# Patient Record
Sex: MALE | Race: WHITE
[De-identification: names, ages, dates, MRNs, and addresses within clinical notes are randomized per-mention and may not be internally consistent; named-entity substitution may affect disease eponyms.]

---

## 2019-01-29 NOTE — NUR
PT ADMITTED TO UNIT WITH RT HIP FX. PT IS NON WEIGHT BEARING TO RLE. PT C/O
PAIN, MEDS GIVEN AS ORDERED. PT IS NPO. PULSES 2+. PT IS ON ROOM AIR. VITAMIN
K IV GIVEN TO REDUCE LAB VALUES. PENDING LAB VALUES, WILL DETERMINE IF SURGERY
TODAY. FALL RISK PRECAUTIONS IN PLACE. HOULRY ROUNDING COMPLETED. WILL
CONTINUE TO MONITOR.

## 2019-01-29 NOTE — NUR
PT REMAINED ALERT AND ORIENTED. PAIN MEDS GIVEN AS ORDERED. IV VITAMIN K AND
ORAL VITAMIN K GIVEN AS ORDERED. PT WILL HAVE SURGERY TOMORROW AT 0700 TO FIX
RT HIP FX. PT IS NWB TO RT LLE. FALL RISK PRECAUTIONS IN PLACE. HOURLY
ROUNDING COMPLETED. WILL CONTINUE TO MONITOR.

## 2019-01-29 NOTE — OP
[address]  [address]                    OPERATIVE REPORT              
_______________________________________________________________________________
 
Name:       LISA OG           Room:           [address] [place]  
M.R.#:  [id number]      Account #:      [id number]  
Admission:  01/29/19     Attend Phys:    Zita Brunner MD 
Discharge:               Date of Birth:  [birth year]  
         Report #: [id number]
                                                                     [id number]  
_______________________________________________________________________________
THIS REPORT FOR:  //name//                      
 
CC: Prasanna Riosakamarkus
 
PREOPERATIVE DIAGNOSIS:  Right intertrochanteric femur fracture, three-part
(greater trochanter and basicervical component).
 
POSTOPERATIVE DIAGNOSIS:  Right intertrochanteric femur fracture, three-part
(greater trochanter and basicervical component).
 
PROCEDURE: 
1.  Closed reduction and cephalomedullary nailing of right 3-part
intertrochanteric femur fracture.
2.  Physician directed fluoroscopy less than 1 hour.
 
SURGEON:  Lisa Davenport DO.
 
ASSISTANT:  Ayaan Sanchez DO
 
ANESTHESIA:  General.
 
ANTIBIOTICS:  Ancef IV preoperatively.
 
FLUIDS:  850 mL lactated Ringer's.
 
BLOOD LOSS:  100 mL.
 
COMPLICATIONS:  None.
 
SPECIMENS:  None.
 
DRAINS:  None.
 
CONDITION:  The patient is stable to PACU.
 
IMPLANTS:  [place] gamma nail 11 x 180 mm x 125 degree gamma nail with 110 mm
lag screw and appropriately 5 mm distal interlock screw.
 
INDICATION FOR PROCEDURE:  The patient was admitted to [facility]
with a right hip fracture.  I had a long conversation with his family as well as
him regarding the plan for surgery as well as risks and complications at
multiple visits.  Please see previous notes for full details.  I obtained their
consent to proceed with surgery.
 
 
 
 
[address] [place] [address]                    OPERATIVE REPORT              
_______________________________________________________________________________
 
Name:       LISA OG           Room:           [address] [place]  
M.R.#:  [id number]      Account #:      [id number]  
Admission:  01/29/19     Attend Phys:    Zita Brunner MD 
Discharge:               Date of Birth:  [birth year]  
         Report #: [id number]
                                                                     [id number]  
_______________________________________________________________________________
DESCRIPTION OF PROCEDURE:  I marked the right lower extremity in the presence of
the operative team members.  Everyone agreed this was correct.  I marked the
right lower extremity in the presence of the operative team members.  Everyone
agreed this was correct.  He was taken back to the operative suite where a
briefing was performed indicating correct patient, procedure, site, antibiotics
and implants were present.  All team members agreed.  General anesthetic was
administered.  He was transferred over to the operative table in supine
position, well-padded, secured the fracture table.  At that point in time, I
used to perform a closed reduction.  We were able to get this reduction
excellent on multiplanar C-arm imaging and confirmed appropriately.  We then
sterilely prepped and draped the right lower extremity in standard fashion. 
Timeout was performed indicating correct patient, procedure, site, antibiotics
and implants were present and sterile.  All team members agreed.  C-arm was
brought in, marked out our landmarks.  I began by making an incision proximal to
the greater trochanter scalpel through skin and careful soft tissue dissection
down to the greater trochanter.  Smooth tipped guidewire was inserted and
confirmed on multiplanar imaging to be in appropriate position and advanced.  We
then reamed over that guidewire.  Implant timeout was performed, and a gamma
nail 11 x 180 mm x 125 degree was thrown on the back table, assembled and
checked and then inserted into the femur.  We inserted this down into its
appropriate position, made an incision for the double sleeve for lag screw
placement.  This was placed down against bone and then the guidewire was
advanced under multiplanar C-arm imaging including a true lateral, which showed
that the guidewire was in appropriate position.  We did measure the guidewire. 
It measured 105.  We therefore threw a 110 screw on to the back table.  After an
implant timeout was confirmed, reamed to 105 and then placed the lag screw over
the guidewire with lag screw being inserted, we took multiple images and
comparing them to the previous imaging.  There was no malrotation of the neck as
the lag screw was being inserted.  We then inserted our set screw, fully
tightened this down and backed off one quarter turn and confirmed that the set
screw was fully engaged by not being able to turn the lag screwdriver handle
that went in.  We then removed the lag screwdriver handle guidewire and then
made an incision for triple sleeve for the static screw distally.  This was
placed down on to bone, drilled, measured appropriately and an appropriate size
screw after being thrown on the back table was inserted, fully sat down to bone.
 At this point in time, we removed the external aiming guide.  Final C-arm
images showed excellent fracture reduction and placement of all hardware in
multiple planes.  We dismissed C-arm.  After saving those images thoroughly,
irrigated all incision sites with normal saline, and we closed the deep layers
for reapproximation of the fascia and IT band with 0 Vicryl figure-of-eight
interrupted.  We irrigated the subcutaneous layer with normal saline and then
closed this with 2-0 Monocryl buried deep and staples for skin.  Debriefing was
performed.  We confirmed the procedure,  blood loss and that all counts were
correct and final.  All team members agreed.  We placed a sterile silver
impregnated Mepilex dressing.  He was extubated and transferred off the
operating table and taken to PACU in stable.
 
 
 
[place]  [address]                    OPERATIVE REPORT              
_______________________________________________________________________________
 
Name:       LISA OG           Room:           [facility]-    ADM [place]  
M.R.#:  [id number]      Account #:      [id number]  
Admission:  01/29/19     Attend Phys:    Zita Brunner MD 
Discharge:               Date of Birth:  [birth year]  
         Report #: [id number]
                                                                     [id number]  
_______________________________________________________________________________
 
POSTOPERATIVE COURSE AND EVALUATION:  I spoke with family members.  They were
very grateful for my time and efforts.  He was resting in PACU with stable vital
signs.  Pain controlled.  He was neurovascularly intact distally in the right
lower extremity with no change from the preoperative exam.  Compartments are
soft and compressible.  With him sitting in a resting position, rotation was
judged to be appropriate.  He has a little bit of internal rotation deformity on
his left leg; therefore, his right-sided external rotation appears to be within
normal parameters.  PACU films showed stable internal fixation and fracture
reduction.  Deep venous thrombosis prophylaxis, he is on Coumadin.  He will
resume this and Medicine will help us with that transition.  He is to weightbear
as tolerated.  PT, OT.  They have access to my personal number, and I have
encouraged to call anytime for questions or concerns.
 
 
 
 
 
 
 
 
 
 
 
 
 
 
 
 
 
 
 
 
 
 
 
 
 
 
 
 
 
 
 
                       
                                        By:                                
                 
D: 01/30/19 0933_______________________________________
T: 01/30/19 Kristin Davenport DO                /nt

## 2019-01-29 NOTE — EKG
[place] [address]
Phone:  [phone]                     ELECTROCARDIOGRAM REPORT      
_______________________________________________________________________________
 
Name:       LISA OG           Room:           [address]    ADM [place]  
M.R.#:  [id number]      Account #:      [id number]  
Admission:  19     Attend Phys:    Prasanna Thomas MD 
Discharge:               Date of Birth:  [birth year]  
         Report #: [id number]
    [id number]
_______________________________________________________________________________
THIS REPORT FOR:  //name//                      
 
                         [facility] ED
                                       
Test Date:    2019               Test Time:    06:09:34
Pat Name:     LISA OG             Department:   
Patient ID:   [id number]            Room:         [facility]
Gender:       M                        Technician:   DG
:          1945               Requested By: Basilia Shipley
Order Number: [id number] MD:   Michael Talavera
                                 Measurements
Intervals                              Axis          
Rate:         71                       P:            4
MN:           201                      QRS:          27
QRSD:         103                      T:            52
QT:           408                                    
QTc:          444                                    
                           Interpretive Statements
Sinus rhythm
Abnormal R-wave progression, early transition
No previous ECG available for comparison
 
Electronically Signed On 2019 15:00:31 CST by Michael Talavera
[email or web address]
 
 
 
 
 
 
 
 
 
 
 
 
 
 
 
 
 
 
 
  <ELECTRONICALLY SIGNED>
                                           By: Michael Talavera MD, [facility]      
  19     1500
D: 19   _____________________________________
T: 19   Michael Talavera MD, FACC        /EPI

## 2019-01-30 NOTE — NUR
VISITED WITH PTS WIFE,KELSIE AND SISTER IN LAW,SUHAS. PT.SLEPT ON AND OFF. WAS
ORIENTED WHEN AWAKE. WIFE IS RETIRED AND IS WITH PT.ALL OF THE TIME. HE CAN BE
LEFT ALONE HOWEVER. HE HAS A WALKER,LIFT RECLINER AND WC FOR LONG DISTANCES.
IS CURRENT WITH [facility] BUT THEY WERE GETTING TO SIGN OFF. IS SEEN AT WOUND CARE
CENTER AS WELL FOR R LEG CELLULITIS AND LYMPHEDEMA. HE DOES NOT DRIVE. WIFE
DOES THE DRIVING. HE TAKES HIS OWN MEDS AND INSULIN. WIFE SAID SHE FILLS HIS
MED BOX. THEY WOULD LIKE TO GO TO OUR ACUTE INPT. REHAB UNIT. PT/OT TO START
TOMORROW. CM WILL MAKE REHAB REFERRAL TOMORROW.  EXPLAINED TO KELSIE THAT
INSURANCE WILL NEED TO AUTHORIZE REHAB.

## 2019-01-30 NOTE — NUR
WOUND CARE NOTE:  CONSULT RECEIVED FOR RIGHT LEG CELLULITIS/LYMPHEDEMA.
 
PATIENT WAS SEEN 1/16/19 IN THE WOUND CENTER WITH DR. SHARP.  PATIENT HAD A
VENOUS TYPE ULCERATION TO THE ANTERIOR ASPECT OF HIS SHIN. DRESSING INTACT.
REMOVED.  ULCERATION HAS HEALED, THERE ARE STABLE SCABS, BUT NO OPEN WOUNDS.
APPLIED LOTION TO LEG.  MEASURED FOR A DOUBLE LAYER TUBIGRIP SIZE F TO ASSIST
IN CONTROLLING EDEMA AND PREVENT REOPENING OF THE ULCERATION SITES.  EDUCATED
THIS TO PATIENT, BUT HE MAY NEED REINFORCEMENT AS HE DID HAVE SURGERY THIS AM.

## 2019-01-30 NOTE — NUR
PT REMAINED ALERT AND ORIENTED. PT C/O PAIN, OXYCODONE GIVEN AS ORDERED. PT
RECEIVED INSULIN FOR LUNCH AND DINNER, PT REQUESTED DOSAGE DUE TO HOME
REGIMEN. PT HAS NOT AMBULATED, HAS BEEN DROWSY AND SLEPT INBETWEEN MEALS.
MEPILEX C/D/I. FALL RISK PRECAUTIONS IN PLACE. HOURLY ROUNDING COMPLETED. WILL
CONTINUE TO MONITOR.

## 2019-01-30 NOTE — NUR
ASSUMED CARE OF PT AT 1900 PT DROWSY BUT AROUSABLE BUT ORIENTED X4 VS AND
ASSESSMENT STABLE.  PT HAD PAIN MEDS Q2H AND XANAX ONCE. PT THE SLEPT IN
BETWEEN FENTANYL DOSES. PT REFUSED Q2H TURNS. WILL CONTINUE PLAN OF CARE

## 2019-01-30 NOTE — CON
[address]  [address]                    CONSULTATION                  
_______________________________________________________________________________
 
Name:       LISA OG           Room:           [address] [place]  
M.R.#:  [id number]      Account #:      [id number]  
Admission:  01/29/19     Attend Phys:    Prasanna Thomas MD 
Discharge:               Date of Birth:  [birth year]  
         Report #: [id number]
                                                                     [id number]  
_______________________________________________________________________________
THIS REPORT FOR:  //name//                      
 
CC: Prasanna Michaels
 
PRIMARY CARE PHYSICIAN:   Donis Michaels MD
 
INDICATION:  Preoperative evaluation for hip fracture.
 
HISTORY OF PRESENT ILLNESS:  The patient is a very pleasant 74-year-old
gentleman who has a history of a mechanical 25 mm [place] aortic valve
replacement 10 years ago.  At that time, he did not require any additional
bypass grafting.  He had no other valvular heart problems.  By recent
echocardiogram, his EF remains normal.  Valve function remains normal.  He is on
warfarin chronically for anticoagulation.  His INR today was 2.0.  The patient
was admitted through the Emergency Room earlier today with right hip fracture. 
Plans are for hip repair.  The patient has been reversed with 5 mg of IV vitamin
K.  The patient denies any history of coronary artery disease or chest pain. 
The patient has no history of congestive heart failure.
 
ALLERGIES:  ATORVASTATIN AND LISINOPRIL.
 
HOME MEDICATIONS:  Multivitamin 1 daily, Metamucil p.r.n., Xanax 0.5 mg as
directed, Keflex 500 mg daily, simvastatin 20 mg daily, warfarin as directed for
INR, tramadol 50 mg as directed, Mirapex p.r.n., citalopram 20 mg daily. 
Telmisartan daily, dose unknown.  Primidone 250 mg tablets as directed, insulin
as directed, Keppra 750 mg as directed, carvedilol 6.25 mg b.i.d.
 
PAST MEDICAL HISTORY:
1.  Hypertension.
2.  Type 2 diabetes mellitus.
3.  Chronic right lower extremity wound.
4.  Peripheral vascular disease.
5.  Coronary artery disease.
6.  Depression.
7.  Restless leg syndrome.
8.  Osteoarthritis.
9.  History of lung cancer, status post resection.
10.  Obstructive sleep apnea.
11.  Prostate cancer.
12.  Aortic valve replacement as outlined above.
13.  History of colon cancer.
14.  Polyneuropathy.
15.  Chronic kidney disease.
 
 
 
[place] [address]                    CONSULTATION                  
_______________________________________________________________________________
 
Name:       LENKALISA MATHIS           Room:           [address] [place]  
[facility].#:  [id number]      Account #:      [id number]  
Admission:  01/29/19     Attend Phys:    Prasanna Thomas MD 
Discharge:               Date of Birth:  [birth year]  
         Report #: [id number]
                                                                     [id number]  
_______________________________________________________________________________
 
FAMILY HISTORY:  Noncontributory.
 
SOCIAL HISTORY:  The patient quit smoking remotely.  He does not drink alcohol.
 
PHYSICAL EXAMINATION:
VITAL SIGNS:  Blood pressure 180/79, pulse 89 and regular.
GENERAL:  This is a pleasant gentleman who appears to be in no distress.  Mood
and affect appropriate.
HEENT:  Head is normocephalic, atraumatic.  Extraocular muscles intact.  Mucous
membranes are moist.
NECK:  Shows no jugular venous distention.  There are no carotid bruits.
CHEST:  Reveals clear lung fields.
CARDIOVASCULAR:  Reveals a regular rhythm with aortic valve click which is
crisp.  I do not appreciate significant murmur.
ABDOMEN:  Reveals normal bowel sounds.  The abdomen is soft and nontender.
EXTREMITIES:  Show the right lower extremity to be wrapped.  There is some
chronic edema of the left lower extremity.
SKIN:  Generally dry.
 
LABORATORY DATA:  A 12-lead EKG shows sinus rhythm without acute ST or T-wave
abnormality.
 
Labs are reviewed.  Sodium 141, potassium 4.8, chloride 106, bicarbonate 25, BUN
48, creatinine 1.5, serum glucose 160.  LFTs within normal limits.  Protime
20.0, INR 2.0.  White blood cell count 4.9, hemoglobin 13.1, platelet count
222,000.  Chest x-ray shows mild right hemidiaphragm elevation.  No acute
infiltrate or pulmonary edema noted.
 
IMPRESSION AND RECOMMENDATIONS:
1.  Fracture of the right hip.  The patient is not at prohibitive cardiac risk
to proceed with surgery.  The patient's INR has been reversed with IV vitamin K
as outlined above.  Okay to proceed with hip repair per Orthopedic Surgery.
2.  History of aortic valve replacement with a mechanical aortic valve.  Okay to
interrupt anticoagulant therapy temporarily for hip repair surgery. 
Echocardiogram has been obtained and is pending at this time.
3.  Hypertension.  We will make adjustments to the patient's antihypertensives
as needed.
4.  Diabetes per the hospital physician.
5.  History of coronary artery disease without disease requiring intervention,
presently stable.  He is not having angina.
 
 
 
 
[place] [address]                    CONSULTATION                  
_______________________________________________________________________________
 
Name:       LISA OG           Room:           [address] [place]  
M.R.#:  [id number]      Account #:      [id number]  
Admission:  01/29/19     Attend Phys:    Prasanna Thomas MD 
Discharge:               Date of Birth:  [birth year]  
         Report #: [id number]
                                                                     [id number]  
_______________________________________________________________________________
At this point in time, the patient appears stable from a cardiac standpoint to
proceed with right hip repair.
 
 
 
 
 
 
 
 
 
 
 
 
 
 
 
 
 
 
 
 
 
 
 
 
 
 
 
 
 
 
 
 
 
 
 
 
 
 
 
 
 
 
<ELECTRONICALLY SIGNED>
                                        By:  Mahesh Leyva MD, FACC   
01/30/19     0846
D: 01/29/19 1354_______________________________________
T: 01/29/19 1448Micld Leyva MD, FACC      /nt

## 2019-01-31 NOTE — NUR
PT ALERT AND ORIENTED X 3. CONFUSED AT TIMES. IVF INFUSING @ 100 ML/HR IN
RIGHT FOREARM.  RECEIVED PO OXYCONDONE FOR PAIN CONTROL. MEPILEX DRESSING
INTACT. PT UP TO CHAIR WITH ASSIST X 3 WITH THERAPY. DRAINAGE NOTED ON MEPILEX
DRESSING AFTER PATIENT UP TO CHAIR.  PT PLACED BACK TO BED WITH FULL BODY
LIFT. PT HAS SCD ON LEFT LEG AND TUBIGRIP ON RLE IN PLACE. PT HAS 2ND IV
SITE-PATENT. HOURLY ROUNDS MAINTAINED. CALL LIGHT WITHIN REACH.

## 2019-01-31 NOTE — NUR
Oriented x 2-3. He is very forgetful. He was drowsy also. He stated that no
one had checked his blood sugar since he had been in the hospital and I told
him that yes we had but he didn't remember and I also told him he had insulin
to get b/c his blood sugar was 280, he was not happy that I was going to only
give him 6 units. He said he is never that high on his blood sugar and that he
needed 18 units for that. i did give him 12 units at his insistance. He later
ask for pain meds and shortly after it was given asked again for pain meds.
Tubigrip stockinet placed to rt lower extremity for cellulitis that looks
healed except a small amount of scabs. Urine output has not been the best. He
is trying right now.

## 2019-02-01 NOTE — NUR
PT ALERT AND ORIENTED X 3. FORGETFUL. UP WITH ASSIST X 3 AND WITH USE OF FULL
BODY LIFT. PT DENIED PAIN THIS SHIFT. DRESSING-C/D/I. PT NOTED TO HAVE LOW
OXYGEN SATURATION AND PLACED ON 2 L/NC. PT INCONTINENT OF URINE. IV PATENT X
2. HOURLY ROUNDS MAINTAINED. CALL LIGHT WITHIN REACH.

## 2019-02-01 NOTE — NUR
PATIENT HAS SLEPT WELL THROUGHOUT MOST OF THE NIGHT. C/O PAIN ONLY WHEN
REPOSITIONING IN BED. VSS ON 2L 02 VIA NASAL CANNULA. DRESSING TO RIGHT HIP
CHANGED AND NEW DRESSING APPLIED D/T BANDAGE BEING SATURATED. PATIENT HAS NOT
BEEN UP DURING SHIFT. PATIENT HAS BEEN INCONTINENT OF BLADDER AT TIMES DURING
THE NIGHT. IV IN LEFT WRIST-SL AND IV IN RIGHT WRIST-SL. PAIN MEDICATIONS
GIVEN AND CHARTED. FALL PRECAUTIONS IN PLACE AND HOURLY ROUNDS MADE. WILL
CONTINUE WITH PLAN OF CARE AND NURSING TO MONITOR.

## 2019-02-01 NOTE — NUR
REHAB EVAL ORDER IN PLACE. DISCUSSED WITH WIFE AND HER SISTER. IF INSURANCE
LORAINE INPT.REHAB, SHE WOULD PREFER SNF AT [facility]. REFERRAL
MADE TO [facility]. AKSED HER NOT TO START AUTH UNTIL WE FIND OUT IF PT.
CAN GO TO INPT.REHAB. DISCUSSED WITH . HE DOES NOT PLAN TO DISCHARGE
PT.OVER THE WEEKEND.

## 2019-02-02 NOTE — NUR
ASSUMED PATIENT CARE AT 1145. PATIENT TRANSFERED FROM [facility] TO  AT 1145 AND
IS IN ROOM 118. PATIENT LETHARGIC, ORIENTED X4. STATES PAIN IS NOW A 1/10. 2L
NC WITH SAT 96%. BLOOD PRESSURE SLIGHTLY HYPOTENSIVE. DR LOUIE NOTIFIED.
ORDERS FOR NS WIDE OPEN. HOUSE SUPERVISOR NOTIFIED FOR SECOND IV FOR BOLUS AND
IS IN ROOM AT THIS TIME ATTEMPTING TO PLACE. DRESSING TO RIGHT HIP INTACT WITH
SMALL AMOUNT OF BLOODY DRAINAGE. CHANGES AT 0600. H&H CONTINUING TO DROP.
CURRENTLY 7.4. RECHECK ENTERED BY PHYSICIAN. WIFE AND SISTER IN THE ROOM. WILL
CONTINUE WITH CURRENT PLAN OF CARE.

## 2019-02-02 NOTE — NUR
I ASSUMED CARE OF THE PATIENT AT 0700.  HE IS ALERT AND ORIENTED X2-3
AND IS UP WITH MAX ASSIST OF 3 AND A ENEDELIA LIFT.  HE LIVES AT HOME WITH HIS
WIFE AND WAS INDEPENDENT WITH A WALKER BEFORE HE BROKE HIS HIP.  PATIENT IS
STILL HAVING BLEEDING FROM THE INCISION SITE.  LABS ARE FOLLOWED AND DR WAS
CONTACTED.  HE RATES HIS PAIN AT 3 BUT REFUSES PAIN MEDS SINCE IT MAKES HIM
TIRED.  HOURLY ROUNDING WAS COMPLETED AND PATIENT NEEDS ARE MET.  BLOOD SUGAR
IS MANAGED WITH INSULIN.  BLOOD THINNER WAS NOT GIVEN UNTIL AFTER DR VERIFIED
THE ORDER.  PATIENT IS NOT TOLERATING WELL AND IS BEING TRANSFERED TO [facility] FOR
CARDIAC MONITORING.  REPORT CALLED TO LOL[facility].  LEFT UNIT AT 1130.

## 2019-02-02 NOTE — NUR
AT APPROXIMATELY, THE RIGHT HIP DRESSING WAS CHANGED WITH [place] DRESSING
AND TELFA PADS TO AVOID STICKING TO THE INCISIONS. SS DRAINAGE WAS NOTED ALONG
WITH BITS OF COAGULATION. PT REFUSED PAIN MEDICATION ALL SHIFT. WILL CONTINUE.

## 2019-02-02 NOTE — NUR
PATIENT PROGRESSING TOWARDS GOALS. BP MAINTAINED AT NORMAL LIMITS TODAY AFTER
NS BOLUS. PATIENT MORE ALERT AFTER TRANSFER. GOOD APPETITE. FAMILY PRESENT AT
BEDSIDE. ORTHO SAW PATIENT AND REINFORCED RIGHT HIP DRESSING. ORDERS TO
REINFORCE NOT CHANGE DRESSING, AND THEY WILL REEVALUATE IN THE AM.

## 2019-02-02 NOTE — NUR
PATIENT BLOOD PRESSURE RESPONDED TO WIDE OPEN SALINE BOLUS. PHYSICIAN AWARE.
PATIENT SITTING UP EATING LUNCH WITH FAMILY PRESENT.

## 2019-02-03 NOTE — NUR
assumed care of pt. Pt resting in bed, spouse at bedside, appears alert o x
3-4, forgetful, denies chest pain, SOB, states adeq pain control with po pain
RX, spouse states pain RX had been making him forgetful. Pt answered all
questions to person, place, time, but seemed forgetful, easily
confused. O 2at 2l per NC, R hip dressing appears C/D/I, bilat feet warm

## 2019-02-03 NOTE — NUR
RECIEVED REPORT AND ASSUMED CARE AT 1900. CARDIAC MONITOR IN PLACE. DISTOLIC
BP A LOW, OTHER THAN THAT VITAL SIGNS STABLE. PT HAS PAIN IN RIGHT HIP AND ALL
OVER AND PRN PAIN MEDS GIVEN AS ORDERED ALSO APPLIED ICE PACKS TO RIGHT HIP.
ASSESSMENT COMPLETED, DISCUSSED PLAN OF CARE. BED LOCKED, ALARM ON AND CALL
LIGHT WITHIN REACH. FALL PRECAUTIONS IN PLACE. HOURLY ROUNDING DONE AND ALL
NEEDS MET. NURSING WILL CONTINUE TO MONITOR.

## 2019-02-03 NOTE — NUR
DR Guillermo on floor rounding, requested clarification on WARFARIN  orders ,
will give 12 mg mow, and give an additioan scheduled 12 mg at 1800

## 2019-02-04 NOTE — NUR
RECIEVED REPORT AND ASSUMED CARE AT 1900. CARDIAC MONITOR IN PLACE. DIASTOLIC
BP SOFT, OTHER THAN THAT VITAL SIGNS STABLE. PT HAS RIGHT HIP PAIN AND PRN
PAIN MEDS GIVEN AS ORDERED. ASSESSMENT COMPLETED, DISCUSSED PLAN OF CARE, PT
UNDERSTANDS. BED LOCKED AND CALL LIGHT WITHIN REACH. FALL PRECAUTIONS IN
PLACE. HOURLY ROUNDING DONE AND ALL NEEDS MET. PT IS ENEDELIA LIFT MAX ASSIST.

## 2019-02-04 NOTE — NUR
CM spoke with Pt's wife and JACOBY regarding disposition. Wife hopeful that Pt
will qualify for acute rehab. Per wife, plan is for Pt to complete acute
rehab, then dc to home with her, HH and the help of their children and
neighbors if needed. If Pt does not qualify for acute rehab, plan is for Pt to
go to [facility] for skilled. Following.

## 2019-02-04 NOTE — NUR
RECEIVED REPORT FROM BENNIE CARDONA. ASSUMED CARE AROUND 0730. PT A&O X4, FORGETFUL
AT TIMES, TIRED THROUGHOUT SHIFT. VSS. CARDIAC MONITOR IN PLACE TRACING SR
WITH OCCASIONAL PACS THROUGHOUT SHIFT. IV TO RIGH HAND INTACT. MEDS PER EMAR.
FAMILY AT BEDSIDE THROUGHOUT MOST OF SHIFT. PT TOLERATING DIET. PT WORKED WITH
PT/OT THIS SHIFT. PT ON AND OFF BEDPAN MULTIPLE TIMES THROUGHOUT DAY - PT ABLE
TO HAVE SEVERAL SMALL BM'S. PT UP TO BEDSIDE CHAIR TODAY, ENEDELIA LIFT USED. PT
REPORTED PAIN IN RIGHT LEG/HIP THAT WAS MANAGED WITH PO PAIN MEDICATION AND
ICE PACKS WITH PARTIAL RELEIF. DRESSING TO RIGHT HIP CDI.  DISCHARGE PLANNING
FOR REHAB/SKILLED NURSING IN PROGRESS. PT CURRENTLY EATING DINNER IN BED. FALL
PRECAUTIONS IN PLACE. CALL LIGHT IS WITHIN REACH. HOURLY ROUNDING PERFORMED.
WILL CONTINUE TO MONITOR FOR DURATION OF SHIFT.

## 2019-02-04 NOTE — NUR
RECIEVED CONSULT FOR POSSIBLE REHAB ADMISSION. CONSULT HAS BEEN ACKNOWLEDGED
BY REHAB LIAISON AND DR. LOW. PATIENT ADMITTED AFTER FALL WITH RIGHT HIP FX
S/P SURGICAL REPAIR. EVALUATED BY PT/OT. HAS DEFINATE REHAB NEEDS. PATIENT IS
MAX A X2-3 SECONDARY TO PAIN AND CONFUSSION. WILL HAVE 24/7 CARE AT DISCHARGE.
PLOF WAS MOD I. SPOKE WITH BUD NOEL REGARDING HOME SITUATION. ST CONSULT
IS PENDING. WILL FOLLOW ALONG WITH PATIENT TO SEE HOW HE PROGRESSES AND IF
ABLE TO TOLERATE 3 HOURS OF THERAPY. THANK YOU FOR THIS CONSULT.

## 2019-02-05 NOTE — NUR
Nutrition: Pt assessed for LOS, pressure ulcer on Lt buttock. Admitted for Rt
hip FX. Wt: 247#. CHO controlled diet. PO intake better. H/o OA, lung ca,
dementia, DM, CAD, HTN. +BM yday. RX: MVI, warfarin, insulin. , alb 2.4,
prealb 14.7. Increased nutrient needs R/T wound healing AEB labs above, wound.
RD will order Jer b.i.d. to aid in wound healing. Consider mild risk.
Protein intake: Jer b.i.d.
Total energy intake: >75% of meals/supplements.

## 2019-02-05 NOTE — NUR
BLOOD TRANSFUSION STARTED. CONT PULSE OX IN PLACE. SAT 95% ON 1L.
/62, PULSE 66, RESP 19, TEMP 98.1. Meds:  piperacillin/tazobactam IVPB. 3.375 Gram(s) IV Intermittent every 12 hours    Allergies:  Allergies    aspirin (Anaphylaxis)    Intolerances      ROS  [  ] UNABLE TO ELICIT    General:  [  ] None  [  ] Fever  [  ] Chills  [ x ] Malaise    Skin:  [ x ] None [  ] Rash  [  ] Wound  [  ] Ulcer    HEENT:  [ x ] None  [  ] Sore Throat  [  ] Nasal congestion/ runny nose  [  ] Photophobia [  ] Neck pain      Chest:  [  ] None   [  ] SOB  [ x ] Cough  [  ] None    Cardiovascular:   [ x ] None  [  ] CP  [  ] Palpitation    Gastrointestinal:  [ x ] None  [  ] Abd pain   [  ] Nausea    [  ] Vomiting   [  ] Diarrhea	     Genitourinary:  [ x ] None [  ] Polyuria   [  ] Urgency  [  ] Frequency  [  ] Dysuria    [  ]  Hematuria       Musculoskeletal:  [  ] None [  ] Back Pain	[  ] Body aches  [  ] Joint pain  [ x ] Weakness     Neurological: [  ] None [  ]Dizziness  [  ]Visual Disturbance  [  ]Headaches   [ x ] Weakness          PHYSICAL EXAM:  Vital Signs Last 24 Hrs  T(C): 36.4 (09 Aug 2018 07:15), Max: 36.8 (08 Aug 2018 15:37)  T(F): 97.6 (09 Aug 2018 07:15), Max: 98.2 (08 Aug 2018 15:37)  HR: 59 (09 Aug 2018 07:15) (59 - 61)  BP: 149/62 (09 Aug 2018 07:15) (137/63 - 174/62)  BP(mean): --  RR: 18 (09 Aug 2018 07:15) (18 - 20)  SpO2: 98% (09 Aug 2018 07:15) (98% - 100%)    Constitutional:    HEENT: [ x ] Wnl  [  ] Pharyngeal congestion    Neck:  [ x ] Supple  [  ]Lymphadenopathy  [ x ] No JVD   [  ] JVD  [  ] Masses   [  ] WNL    CHEST/Respiratory:  [  ]Clear to auscultation  [ x ] Rales   [  ] Rhonchi   [  ] Wheezing     [  ] Chest Tenderness      Cardiovascular:  [ x ] Reg S1 S2   [  ] Irreg S1 S2   [ x ]No Murmur  [  ] +ve Murmurs  [  ]Systolic [  ]Diastolic      Abdomen:  [ x ] Soft  [ x ] No tendrerness  [  ] Tenderness  [  ] Organomegaly  [  ] ABD Distention  [  ] Rigidity                       [ x ] No Regidity                       [ x ] No Rebound Tenderness  [  ] No Guarding Rigidity  [  ] Rebound Tenderness[  ] Guarding Rigidity                          [ x ]  +ve Bowel Sounds  [  ] Decreased Bowel Sounds    [  ] Absent Bowel Sounds                            Extremities: [ x ] No edema [  ] Edema  [  ] Clubbing   [  ] Cyanosis                         [ x ] No Tender Calf muscles  [  ] Tender Calf muscles                        [ x ] Palpable peripheral pulses    Neurological: [ x ] Awake  [ x ] Alert  [ x ] Oriented  x  3                           [  ] Confused  [  ] Drowsy  [  ] respond to painful stimuli  [  ] Unresponsive    Skin:  [ x ] Intact [  ] Redness [  ] Thrombophlebitis  [  ] Rashes  [  ] Dry  [  ] Ulcers    Ortho:  [  ] Joint Swelling  [  ] Joint erythema [  ] Joint tenderness                [  ] Increased temp. to touch  [  ] DJD [ x ] WNL        LABS/DIAGNOSTIC TESTS                        9.4    8.8   )-----------( 152      ( 09 Aug 2018 06:58 )             28.4   08-09    140  |  106  |  69<H>  ----------------------------<  176<H>  4.4   |  25  |  2.56<H>    Ca    9.1      09 Aug 2018 06:58  Phos  3.8     08-09  Mg     2.5     08-09    TPro  6.1  /  Alb  2.8<L>  /  TBili  0.7  /  DBili  x   /  AST  8<L>  /  ALT  14  /  AlkPhos  52  08-09        CULTURES:   Culture - Blood (08.08.18 @ 00:42)    Specimen Source: .Blood Blood    Culture Results:   No growth to date.    Culture - Blood (08.08.18 @ 00:42)    Specimen Source: .Blood Blood    Culture Results:   No growth to date.      RADIOLOGY:    EXAM:  XR CHEST AP OR PA 1V                            PROCEDURE DATE:  08/07/2018          INTERPRETATION:  Cough.    AP chest. Prior earlier same day.  Low lung volumes. No change heart mediastinum. No change in left basilar   consolidation/atelectasis and small effusion. No pneumothorax or other   new abnormality. Right cardiac pacer reidentified in position.    Impression: Essentially stable exam.          EXAM:  CT CHEST                            PROCEDURE DATE:  08/05/2018          INTERPRETATION:  CT of the Chest without contrast    HISTORY: Shortness of breath with left infiltrate/effusion on chest x-ray    Technique: Multi-slice volumetric acquisition using 2.5 mm collimation.    Intravenous contrast was not administered.     INTERPRETATION: The lung windows demonstrate volume loss of the left lung   with mediastinal shift to the left. The left bronchus is occluded near   its origin likely due to retained secretions. The left lower lobe is   entirely consolidated. There is apparent normal aeration of the left   upper lobe indicating the occlusion of the left bronchus may have   occurred recently.      The mediastinum shows borderline heart size. Right cardiac pacemaker is   present. Prominent lymph nodes are seen in the left paratracheal space.      No pericardial nor pleural effusion is identified.      The trachea and proximal right bronchus show no focal lesions.    The bony structures show spurring along the thoracic spine.      Below the hemidiaphragms, left renal cysts are present and calcified   gallstones lie dependently in the gallbladder. The patient is status post   right nephrectomy. Bowel overlies lateral to the liver.    IMPRESSION: Occluded left bronchus with consolidation of the left lower   lobe and mediastinal shift to the left.          EXAM:  XR CHEST AP OR PA 1V                            PROCEDURE DATE:  08/05/2018          INTERPRETATION:  Chest one view    HISTORY: Chest pain    COMPARISON STUDY: 8/2/2018    Rotated expiratory view of the chest shows the heart to be probably   similar in size. The lungs show consolidation of the left lung base   indicating infiltrate or fluid or a combination of both and there is no   evidence of pneumothorax. Right cardiac pacemaker is again noted.    IMPRESSION:  Left base fluid/infiltrate.      Assessment and Recommendation:   Patient is a 78 y/o male from home, ambulates with a cane with PMHX of CAD, + Stent placed in 2009 on Plavix QOD, AICD placed in 2011 then removed as it got infected, later had a right sided pacemaker placed in dec 2017 for bradycardia , Prostate CA, S/P surgery and RTX, Rt Nephrectomy for Rt Renal cell CA, Htn, HLD  copd, CKD ( baseline creatinine is 2.0) , Bradycardia (s/p Medtronic AICD), CKD S/P Colonoscopy in March 2015 c/w Diverticulosis , HX of Polyps removed, PVD ( s/p balloon left leg stent) came with complain of dyspnea and dry cough x 1 day. Patient was admitted for pneumonia and was started on IV Zosyn, renally adjusted dose.  8/7/18 CXR suggested no change and patient is planned to have CT chest 8/8/18.    Problem/Recommendation - 1:  Problem: Pneumonia, bacterial.   Recommendation:   1- Continue IV Zosyn 3.375 gm IVPB q 12 hours to change to po Levaquin 250 mg po q day till 8/14/18.  2- Renal management.  3- O2 as needed.  4- Bronchodilators, and Pulmonary management.  5- F/u chest CT asper PCP.  6- Blood cultures are still -VE.    Problem/Recommendation - 2:  ·  Problem: COPD exacerbation.    Recommendation:   1- Bronchodilators.  2- O2 as needed.  3- Pulmonary management, and follow up.  4- Steroids as per primary, and pulmonary team.     Problem/Recommendation - 3:  ·  Problem: Hypertension, and CHF.    Recommendation:   1- Monitor Blood pressure closely.  2- Blood pressure control.  3- BP. meds as per cardiology and primary care team.   4- Cardiology management and follow up.    Problem/Recommendation - 4:  ·  Problem: CKD (chronic kidney disease).    Recommendation:   1- ABX dose adjustment.  2- Fluid and electrolytes management.   3- Nephrology follow up.     Discussed with medical resident, and PCP.

## 2019-02-05 NOTE — NUR
ASSUMED CARE OF PATIENT AT THIS TIME. IV X2 SALINE LOCKED. HAVING A BM ON
BEDPAN. O2 AT 1L- SAT 98%. PRESSURE DRESSING TO RIGHT HIP C/D/I. TUBIGRIP TO
RIGHT LEG- REMOVED FOR SKIN CHECK- NO BREAKDOWN NOTED. LEFT SOCK REMOVED FOR
SKIN CHECK- LEFT HEEL BOGY-NO BREAKDOWN-ELEVATED ON PILLOW. DRESSING TO LEFT
SHIN ULCER C/D/I. SKIN TEAR TO RIGHT FOREARM- DRESSING CHANGED- HEALING, BUT
HAS SOME OPEN AREA STILL. REPOSITIONED. ORAL CARE PROVIDED AS PATIENT HAD SOME
RED SPUTUM IN CORNERS OF MOUTH- DENTURES REMOVED AND CLEANED- NO OPEN AREAS
NOTED IN MOUTH.  ORIENTED TO NEW ROOM AND ENVIROMENT. BLOOD TRANSFUSION WAS
ORDERED, BUT BLOOD IS NOT READY YET. PATIENT IS BEING TRANSFERRED VIA ENEDELIA
LIFT. FAMILY AT BEDSIDE. WILL CONTINUE TO MONITOR.

## 2019-02-05 NOTE — NUR
VSS, ASSUMED CARE IN THE AM, ASSESSMENT PERFORMED AND CHARTED, FALL
PRECAUTIONS IN PLACE AND CALL LIGHT IN REACH, PT IS A&O4 AND UP WITH MAX 2
PEOPLE, PT GOAL IS TO WORK WITH PT/OT AND SIT UP IN CHAIR, PT IS ON 1L NC AND
HAS BEEN MADE MED-SURG STATUS, FAMILY AT BEDSIDE, PT CAN BEEN CONFUSED AT
TIMES, CALLED REPORT TO JSSI AND GAVE REPORT WILL BE TAKING PT BY BED TO ROOM
111 HOURLY ROUNDS COMPLETED.

## 2019-02-05 NOTE — NUR
ASSUMED CARE OF PT AFTER REPORT AT 1930. PT A&OX4. CONFUSED & FORGETFUL
AT TIMES. VSS.  PHYSICAL ASSESSMENT COMPLETED AND CHARTED. PT ON O2 AT 1L NC
WITH 95% O2 SAT.  PT TRACING SR PAC ON TELE. PT WITH EPISODES OF INCONTINENT
BLADDER. SKIN TEAR ON LEFT BUTTOCKS NOTED. PHOTOGRAPH TAKEN. COVERED WITH
MEPILEX. CALL LIGHT WITHIN REACH. BED IN LOW POSITION.

## 2019-02-06 NOTE — NUR
PT WAS A&Ox3 DURING SHIFT. VITALS STABLE. 1 UNIT OF BLOOD INFUSED AND
COMPLETED. IV IN L FA LU, SL. WIFE AND SISTER IN LAW AT BEDSIDE. FALL
PRECAUTIONS IN PLACE. HOURLY ROUNDING COMPLETE. Q2H TURNS COMPLETE. CALL LIGHT
WITHIN REACH. WILL CONTINUE TO MONITOR.

## 2019-02-06 NOTE — NUR
DUE TO PT'S CURRENT PROGRESS WITH THERAPY AND INABILITY TO TOLERATE 3 HOURS OF
THERAPY, RECOMMENDATIONS THAT PT DISCHARGE TO SNF.  THANK YOU

## 2019-02-06 NOTE — NUR
[facility] DENIED PT. RECOMMENDS SNF INITIALLY. SEE REHAB NOTE. CM DISCUSSED
WITH WIFE AND SISTER. PT.SLEEPING. WIFE VERBALIZED AN UNDERSTANDING. SHE SAID
PT.REALLY WANTS TO GO TO [facility]. SADIE CALLED ANGELINE AND FAXED
UPDATED INFORMATION TO HER. SHE WILL NEED TO GET INSURANCE AUTHORIZATION.

## 2019-02-06 NOTE — NUR
ASSUMED CARE OF PATIENT AT APPROX 0730. ALERT AND ORIENTED X4. ASSESSMENT
COMPLETED AND AS CHARTED. VSS ON ROOM AIR. NO COMPLAINTS OF NAUSEA OR SOA.
PAIN HAS BEEN MANAGED WITH ORAL MEDICATION. PATIENT IS MAX ASSIST FOR TURNS
AND BEDPAN. RESTED COMFORTABLY IN BED THROUGHOUT THE SHIFT. FALL PRECAUTIONS
IN PLACE. CALL LIGHT WITHIN REACH. HOURLY ROUNDS COMPLETED. NURSING WILL
CONTINUE TO MONITOR.

## 2019-02-07 NOTE — NUR
PATIENT HAS SLEPT OFF AND ON DURING THE NIGHT BUT RESTLESS AT TIMES. VSS ON 1L
02 VIA NASAL CANNULA. PAIN HAS NOT BEEN WELL CONTROLLED DURING THE NIGHT.
PATIENTS FAMILY AND PATIENT REFUSES TO TAKE OXYCODONE. PAIN MEDICATIONS GIVEN
AS ORDERED AND CHARTED. PATIENT REPOSITIONED EVERY 2HRS AND PRN. FAMILY AT
BEDSIDE. DRESSING INTACT. IV IN LEFT FOREARM-SL. IV IN RIGHT HAND-SL. PATIENTS
FAMILY WOULD LIKE PT ON HOLD THIS AM UNTIL ORTHO SEES PATIENT. FALL
PRECAUTIONS IN PLACE AND HOURLY ROUNDS MADE. WILL CONTINUE WITH PLAN OF CARE
AND NURSING TO MONITOR.

## 2019-02-07 NOTE — NUR
ASSUMED CARE OF PATIENT AT APPROX 0730. ALERT AND ORIENTED X4. ASSESMENT
COMPLETED AND AS CHARTED. VSS ON 1 LITER 02. NO COMPLAINTS OF NAUSEA, OR SOA.
PAIN HAS BEEN MANAGED WITH PRN AND SCHEDULED MEDICATIONS. PATIENT HAD A "ROUGH
NIGHT, CRYING IN PAIN" ACCORDING TO FAMILY. PATIENT UP TO CHAIR TODAY WITH
THERAPY. GABAPENTING ORDERED SCHEDULED TODAY, GIVEN TWO DOSES AND PATIENT HAS
BEEN VERY SLEEPY, VSS BUT PATIENT REALLY HARD TO AWAKEN EVEN WITH STERNAL RUB.
THIS NURSE AND FAMILY WORKED TO WAKE THE PATIENT, TALKING WITH HIM AND HAVING
HIM SING AND TELL STORIES TO BECOME ALERT. PATIENT FINALLY ALERT AND HAVING
CONVERSATION WITH THIS NURSE AND HIS FAMILY AT DINNER TIME. FAMILY WOULD LIKE
TO TRY JUST TRAMADOL FOR PAIN MANAGEMENT AS HE TAKES THIS AT HOME. THEY FEEL
THAT THE HYDROCODONE MAY BE TOO MUCH. THEY FEEL THAT THE GABAPENTIN HAS HELPED
HIS PAIN BUT IS MAKING HIM VERY SLEEPY. WILL DISCUS WITH PHYSICIAN. FALL
PRECAUTIONS IN PLACE. CALL LIGHT WITHING REACH. HOURLY ROUNDS COMPLETED.
NURSING WILL CONTINUE TO MONITOR.

## 2019-02-08 NOTE — NUR
ANGELINE/RIANNA DAS [facility] CALLED AND SAID THEY RECEIVED AUTHORIZATION
FOR PT.TO GO TO SNF.  SHE ASKED THAT  SET UP WC VAN WITH [facility] AND THEY
WOULD PAY FOR IT. SET UP WC VAN FOR [id number] WITH [facility] [phone].
PT.NEEDS LG WC AND O2 AT 1L/NC. ANGELINE CALLED BACK AND SAID WIFE HAD SAID
PT.WOULD NEED STRETCHER VAN.  CALL FLOOR AND ASKED MAYARN,PT.S NURSE. SHE
SAID PT.HAD GOTTEN UP TO CHAIR TODAY WITH THERAPY AND COULD GO BY WC VAN.
VANI TO INFORM MAYA OF DISCHARGE TIME, FAX DISCHARGE ORDERS. CHART COPIED
TO GO WITH PT. NURSING TO CALL REPORT [phone]. LEFT VM ON WIFE'S CELL
PHONE REGARDING DISCHARGE AND TIME.

## 2019-02-08 NOTE — NUR
WOUND CARE NOTE:  CONSULT RECEIVED FOR SACRAL BREAKDOWN.
 
PATIENT PRESENTS WITH PARTIAL THICKNESS ULCERATION TO THE LEFT BUTTOCK.  WOUND
MEASURES 1.5X1X0.1.  MOIST, PINK WOUND BED. HUMBERTO-WOUND MACERATED.  CLEANSED
WOUND, PATTED DRY.  APPLIED SKIN PREP TO HUMBERTO-WOUND.  COVERED WITH EXUDERM.
SECURED WITH TEGADERM.
 
PATIENT ALSO HAS SMALL RAISED PURPLE DISCOLORATION TO THE RIGHT BUTTOCK, NO
OPENINGS.
 
PATIENT HAS BEEN USING BED PAN FREQUENTLY, BELIEVE THESE ARE FRICTION INJURIES
RELATED TO THIS.
 
RECOMMEND
TURN Q2 HOURS, KEEP OFF WOUNDS
WAFFLE CUSHION WHEN IN CHAIR
CONTINUE WITH DOUBLE LAYER TUBIGRIP TO THE LEFT LEG
ENCOURAGE GOOD NUTRTION/HYDRATION
TIGHT BLOOD GLUCOSE CONTROL.

## 2019-02-08 NOTE — NUR
INITAL ASSESMENT COMPLETED AT 2100. PT RESTING QUIETLY AT THAT TIME. PT GIVEN
PRN ULTRAM FOR PAIN IN RIGHT HIP. HS CARES DONE AT THAT TIME. VITAL SIGNS
WITHIN NORMAL LIMITS. ULTRASOUND AND XRAY RESULTS REVIEWED WITH PT AND WIFE.
CALL LIGHT IN REACH, HOURLY CHECKS DONE. FALL PRECAUTIONS IN PLACE

## 2019-02-08 NOTE — NUR
PATIENT REMAINED ALERT AND ORIENTED X'S 3-4. VITAL SIGNS STABLE. PATIENT HAS
BEEN DC'D BUT SHIFT HAS ENDED AND NURSE WILL BE GIVING QUICK REPORT SINCE
RIDE HAS NOT ARRIVED YET.  DC PAPERWORK COMPLETED, PATIENT READY TO GO. WILL
CONTINUE TO MONITOR.

## 2020-01-08 NOTE — EKG
[place] [address]
Phone:  [phone]                     ELECTROCARDIOGRAM REPORT      
_______________________________________________________________________________
 
Name:       LISA OG DELFINA           [id number]      Account #:      [id number]  
Admission:  20     Attend Phys:                         
Discharge:  20     Date of Birth:  [birth year]  
         Report #: [id number]
    [id number]
_______________________________________________________________________________
THIS REPORT FOR:  //name//                      
 
                         [facility] ED
                                       
Test Date:    2020               Test Time:    14:21:04
Pat Name:     LISA OG             Department:   
Patient ID:   [id number]            Room:          
Gender:       M                        Technician:   CONSTANTINE
:          1945               Requested By: Jermaine Ibrahim
Order Number: [id number] MD:   Michael Talavera
                                 Measurements
Intervals                              Axis          
Rate:         76                       P:            11
TN:           188                      QRS:          4
QRSD:         123                      T:            17
QT:           392                                    
QTc:          441                                    
                           Interpretive Statements
Sinus rhythm
early transition
Compared to ECG 2019 06:09:34
No significant changes
 
Electronically Signed On 2020 10:07:44 CST by Michael Talavera
[email or web address]
 
 
 
 
 
 
 
 
 
 
 
 
 
 
 
 
 
 
  <ELECTRONICALLY SIGNED>
                                           By: Michael Talavera MD, FACC      
  20     1007
D: 20 1421   _____________________________________
T: 20 1421   Michael Talavera MD, FACC        /EPI

## 2020-05-06 ENCOUNTER — HOSPITAL ENCOUNTER (OUTPATIENT)
Dept: HOSPITAL 96 - M.WC | Age: 75
End: 2020-05-06
Attending: PODIATRIST
Payer: MEDICARE

## 2020-05-06 DIAGNOSIS — E11.40: ICD-10-CM

## 2020-05-06 DIAGNOSIS — Z85.46: ICD-10-CM

## 2020-05-06 DIAGNOSIS — Z79.4: ICD-10-CM

## 2020-05-06 DIAGNOSIS — E11.621: ICD-10-CM

## 2020-05-06 DIAGNOSIS — L03.115: ICD-10-CM

## 2020-05-06 DIAGNOSIS — F32.9: ICD-10-CM

## 2020-05-06 DIAGNOSIS — G25.81: ICD-10-CM

## 2020-05-06 DIAGNOSIS — E78.5: ICD-10-CM

## 2020-05-06 DIAGNOSIS — M48.061: ICD-10-CM

## 2020-05-06 DIAGNOSIS — I25.10: ICD-10-CM

## 2020-05-06 DIAGNOSIS — F03.90: ICD-10-CM

## 2020-05-06 DIAGNOSIS — L97.811: ICD-10-CM

## 2020-05-06 DIAGNOSIS — E11.319: ICD-10-CM

## 2020-05-06 DIAGNOSIS — I96: ICD-10-CM

## 2020-05-06 DIAGNOSIS — I10: ICD-10-CM

## 2020-05-06 DIAGNOSIS — L97.421: ICD-10-CM

## 2020-05-06 DIAGNOSIS — F41.9: ICD-10-CM

## 2020-05-06 DIAGNOSIS — E11.622: Primary | ICD-10-CM

## 2020-05-06 DIAGNOSIS — G47.30: ICD-10-CM

## 2020-05-06 DIAGNOSIS — Z90.49: ICD-10-CM

## 2020-05-06 DIAGNOSIS — Z87.891: ICD-10-CM

## 2020-05-06 DIAGNOSIS — G40.909: ICD-10-CM

## 2020-05-06 DIAGNOSIS — E11.52: ICD-10-CM

## 2020-05-06 DIAGNOSIS — Z85.118: ICD-10-CM

## 2020-05-06 DIAGNOSIS — L97.411: ICD-10-CM

## 2020-05-06 DIAGNOSIS — M19.90: ICD-10-CM

## 2020-05-06 LAB
ABSOLUTE BASOPHILS: 0 THOU/UL (ref 0–0.2)
ABSOLUTE EOSINOPHILS: 0.1 THOU/UL (ref 0–0.7)
ABSOLUTE MONOCYTES: 0.4 THOU/UL (ref 0–1.2)
ALBUMIN SERPL-MCNC: 3 G/DL (ref 3.4–5)
ALP SERPL-CCNC: 140 U/L (ref 46–116)
ALT SERPL-CCNC: 25 U/L (ref 30–65)
ANION GAP SERPL CALC-SCNC: 6 MMOL/L (ref 7–16)
AST SERPL-CCNC: 19 U/L (ref 15–37)
BASOPHILS NFR BLD AUTO: 0.7 %
BILIRUB SERPL-MCNC: 0.1 MG/DL
BUN SERPL-MCNC: 25 MG/DL (ref 7–18)
CALCIUM SERPL-MCNC: 8.4 MG/DL (ref 8.5–10.1)
CHLORIDE SERPL-SCNC: 105 MMOL/L (ref 98–107)
CO2 SERPL-SCNC: 29 MMOL/L (ref 21–32)
CREAT SERPL-MCNC: 1.4 MG/DL (ref 0.6–1.3)
EOSINOPHIL NFR BLD: 1.8 %
ESR (SEDRATE): 77 MM/HR (ref 0–20)
GLUCOSE SERPL-MCNC: 167 MG/DL (ref 70–99)
GRANULOCYTES NFR BLD MANUAL: 75.4 %
HCT VFR BLD CALC: 31.3 % (ref 42–52)
HGB BLD-MCNC: 10.4 GM/DL (ref 14–18)
LYMPHOCYTES # BLD: 0.9 THOU/UL (ref 0.8–5.3)
LYMPHOCYTES NFR BLD AUTO: 15.6 %
MCH RBC QN AUTO: 29.2 PG (ref 26–34)
MCHC RBC AUTO-ENTMCNC: 33.3 G/DL (ref 28–37)
MCV RBC: 87.7 FL (ref 80–100)
MONOCYTES NFR BLD: 6.5 %
MPV: 7.2 FL. (ref 7.2–11.1)
NEUTROPHILS # BLD: 4.5 THOU/UL (ref 1.6–8.1)
NUCLEATED RBCS: 0 /100WBC
PLATELET COUNT*: 314 THOU/UL (ref 150–400)
POTASSIUM SERPL-SCNC: 4.3 MMOL/L (ref 3.5–5.1)
PROT SERPL-MCNC: 7.9 G/DL (ref 6.4–8.2)
RBC # BLD AUTO: 3.57 MIL/UL (ref 4.5–6)
RDW-CV: 15.8 % (ref 10.5–14.5)
SODIUM SERPL-SCNC: 140 MMOL/L (ref 136–145)
WBC # BLD AUTO: 5.9 THOU/UL (ref 4–11)

## 2020-05-13 ENCOUNTER — HOSPITAL ENCOUNTER (OUTPATIENT)
Dept: HOSPITAL 96 - M.WC | Age: 75
End: 2020-05-13
Attending: PODIATRIST
Payer: MEDICARE

## 2020-05-13 DIAGNOSIS — L97.811: ICD-10-CM

## 2020-05-13 DIAGNOSIS — F32.9: ICD-10-CM

## 2020-05-13 DIAGNOSIS — L97.522: ICD-10-CM

## 2020-05-13 DIAGNOSIS — F03.90: ICD-10-CM

## 2020-05-13 DIAGNOSIS — L97.411: ICD-10-CM

## 2020-05-13 DIAGNOSIS — G40.909: ICD-10-CM

## 2020-05-13 DIAGNOSIS — Z87.891: ICD-10-CM

## 2020-05-13 DIAGNOSIS — E11.40: ICD-10-CM

## 2020-05-13 DIAGNOSIS — I25.10: ICD-10-CM

## 2020-05-13 DIAGNOSIS — G25.81: ICD-10-CM

## 2020-05-13 DIAGNOSIS — E11.319: ICD-10-CM

## 2020-05-13 DIAGNOSIS — E78.5: ICD-10-CM

## 2020-05-13 DIAGNOSIS — L97.422: ICD-10-CM

## 2020-05-13 DIAGNOSIS — Z85.46: ICD-10-CM

## 2020-05-13 DIAGNOSIS — I10: ICD-10-CM

## 2020-05-13 DIAGNOSIS — E11.621: ICD-10-CM

## 2020-05-13 DIAGNOSIS — I96: ICD-10-CM

## 2020-05-13 DIAGNOSIS — M48.061: ICD-10-CM

## 2020-05-13 DIAGNOSIS — M19.90: ICD-10-CM

## 2020-05-13 DIAGNOSIS — E11.52: ICD-10-CM

## 2020-05-13 DIAGNOSIS — L03.115: ICD-10-CM

## 2020-05-13 DIAGNOSIS — F41.9: ICD-10-CM

## 2020-05-13 DIAGNOSIS — E11.622: Primary | ICD-10-CM

## 2020-05-13 DIAGNOSIS — G47.30: ICD-10-CM

## 2020-05-20 ENCOUNTER — HOSPITAL ENCOUNTER (OUTPATIENT)
Dept: HOSPITAL 96 - M.WC | Age: 75
End: 2020-05-20
Attending: PODIATRIST
Payer: MEDICARE

## 2020-05-20 DIAGNOSIS — E11.52: ICD-10-CM

## 2020-05-20 DIAGNOSIS — L97.422: ICD-10-CM

## 2020-05-20 DIAGNOSIS — L03.115: ICD-10-CM

## 2020-05-20 DIAGNOSIS — E11.621: Primary | ICD-10-CM

## 2020-05-20 DIAGNOSIS — Z87.891: ICD-10-CM

## 2020-05-20 DIAGNOSIS — E11.42: ICD-10-CM

## 2020-05-20 DIAGNOSIS — L97.411: ICD-10-CM

## 2020-05-20 DIAGNOSIS — L97.521: ICD-10-CM

## 2020-05-20 DIAGNOSIS — F41.9: ICD-10-CM

## 2020-05-20 DIAGNOSIS — I96: ICD-10-CM

## 2020-05-21 NOTE — CON
[address]  [address]                    CONSULTATION                  
_______________________________________________________________________________
 
Name:       LENKALISA DEVAN                 Room:                      [facility] 
M.R.#:  [id number]      Account #:      [id number]  
Admission:  05/20/20     Attend Phys:    Saroj Castellanos DPM 
Discharge:               Date of Birth:  [birth year]  
         Report #: [id number]
                                                                     [id number]  
_______________________________________________________________________________
THIS REPORT FOR:  //name//                      
 
cc:  Donis Michaels MD, Vinod N. MD                                               ~
THIS REPORT FOR:  //name//                      
 
CC: Saroj Michaels
 
DATE OF SERVICE:  05/20/2020
 
 
INFECTIOUS DISEASE CONSULTATION
 
He was seen in outpatient [facility] at [place].
 
ATTENDING PHYSICIAN:  Saroj Castellanos DPM
 
HISTORY OF PRESENT ILLNESS:  He presents today for ongoing treatment for
bilateral posterior heel decubitus ulcers, it has been longstanding.  The left
complicated by polymicrobial infection for which he was initially seen 2 weeks
ago, been on therapy with Augmentin.  Generally, he denies significant pain,
although he does have some throbbing in his left ankle today.  He denies any
systemic illness.  No fevers or chills.  Appetite has actually been somewhat
improved.  On evaluation, the wounds appear to be marginally better, quite
superficial on the right, has good granulation tissue left, there is some degree
of necrotic material.  There is no particular odor, no purulence.  Overall, the
appearance has improved with increased granulation.  There is a suggestion of
exposed hard tissue.  Overall degree of inflammation has decreased.
 
Review of the labs notable for the sed rate being 70 range.
 
Bilateral heel decubitus ulcers, we will continue the Augmentin as prescribed. 
Discussed with the patient and his spouse in detail.  Dr. Castellanos debrided the
wound and see how he does over the course of next couple of weeks.  I encouraged
to optimize his nutritional intake and offload the sites.
 
 
 
 
 
 
 
 
<ELECTRONICALLY SIGNED>
                                        By:  Malik Silva MD           
05/21/20     1131
D: 05/21/20 0837_______________________________________
T: 05/21/20 0858Joector Silva MD              /nt

## 2020-05-27 ENCOUNTER — HOSPITAL ENCOUNTER (OUTPATIENT)
Dept: HOSPITAL 96 - M.WC | Age: 75
End: 2020-05-27
Attending: PODIATRIST
Payer: MEDICARE

## 2020-05-27 DIAGNOSIS — E11.40: ICD-10-CM

## 2020-05-27 DIAGNOSIS — I25.10: ICD-10-CM

## 2020-05-27 DIAGNOSIS — G47.30: ICD-10-CM

## 2020-05-27 DIAGNOSIS — I10: ICD-10-CM

## 2020-05-27 DIAGNOSIS — E11.51: ICD-10-CM

## 2020-05-27 DIAGNOSIS — Z85.118: ICD-10-CM

## 2020-05-27 DIAGNOSIS — L03.115: ICD-10-CM

## 2020-05-27 DIAGNOSIS — F32.9: ICD-10-CM

## 2020-05-27 DIAGNOSIS — L97.422: ICD-10-CM

## 2020-05-27 DIAGNOSIS — F41.9: ICD-10-CM

## 2020-05-27 DIAGNOSIS — F03.90: ICD-10-CM

## 2020-05-27 DIAGNOSIS — G25.81: ICD-10-CM

## 2020-05-27 DIAGNOSIS — L97.522: ICD-10-CM

## 2020-05-27 DIAGNOSIS — G40.909: ICD-10-CM

## 2020-05-27 DIAGNOSIS — Z79.4: ICD-10-CM

## 2020-05-27 DIAGNOSIS — Z85.46: ICD-10-CM

## 2020-05-27 DIAGNOSIS — Z87.891: ICD-10-CM

## 2020-05-27 DIAGNOSIS — E11.319: ICD-10-CM

## 2020-05-27 DIAGNOSIS — E11.621: Primary | ICD-10-CM

## 2020-05-27 DIAGNOSIS — E78.5: ICD-10-CM

## 2020-05-27 DIAGNOSIS — M48.061: ICD-10-CM

## 2020-05-27 DIAGNOSIS — L97.412: ICD-10-CM

## 2020-06-03 ENCOUNTER — HOSPITAL ENCOUNTER (OUTPATIENT)
Dept: HOSPITAL 96 - M.WC | Age: 75
End: 2020-06-03
Attending: PODIATRIST
Payer: MEDICARE

## 2020-06-03 DIAGNOSIS — L03.115: ICD-10-CM

## 2020-06-03 DIAGNOSIS — G40.909: ICD-10-CM

## 2020-06-03 DIAGNOSIS — L97.422: ICD-10-CM

## 2020-06-03 DIAGNOSIS — E11.319: ICD-10-CM

## 2020-06-03 DIAGNOSIS — L97.521: ICD-10-CM

## 2020-06-03 DIAGNOSIS — G25.81: ICD-10-CM

## 2020-06-03 DIAGNOSIS — I10: ICD-10-CM

## 2020-06-03 DIAGNOSIS — Z85.46: ICD-10-CM

## 2020-06-03 DIAGNOSIS — F32.9: ICD-10-CM

## 2020-06-03 DIAGNOSIS — M19.90: ICD-10-CM

## 2020-06-03 DIAGNOSIS — L97.412: ICD-10-CM

## 2020-06-03 DIAGNOSIS — G47.30: ICD-10-CM

## 2020-06-03 DIAGNOSIS — E11.52: ICD-10-CM

## 2020-06-03 DIAGNOSIS — E11.40: ICD-10-CM

## 2020-06-03 DIAGNOSIS — E78.5: ICD-10-CM

## 2020-06-03 DIAGNOSIS — F03.90: ICD-10-CM

## 2020-06-03 DIAGNOSIS — Z87.891: ICD-10-CM

## 2020-06-03 DIAGNOSIS — E11.621: Primary | ICD-10-CM

## 2020-06-03 DIAGNOSIS — F41.9: ICD-10-CM

## 2020-06-03 DIAGNOSIS — I25.10: ICD-10-CM

## 2020-06-03 DIAGNOSIS — I96: ICD-10-CM

## 2020-06-03 DIAGNOSIS — Z85.118: ICD-10-CM

## 2020-06-04 NOTE — CON
[address]  [address]                    CONSULTATION                  
_______________________________________________________________________________
 
Name:       LISA OG                 Room:                      [facility] 
M.R.#:  [id number]      Account #:      [id number]  
Admission:  06/03/20     Attend Phys:    Saroj Castellanos DPM 
Discharge:               Date of Birth:  [birth year]  
         Report #: [id number]
                                                                     [id number]  
_______________________________________________________________________________
THIS REPORT FOR:  //name//                      
 
cc:  Donis Michaels MD, Vinod N. MD                                               ~
THIS REPORT FOR:  //name//                      
 
CC: Saroj Michaels
 
DATE OF SERVICE:  06/03/2020
 
 
INFECTIOUS DISEASE CONSULTATION
 
ATTENDING PHYSICIAN:  Saroj Castellanos DPM
 
HISTORY OF PRESENT ILLNESS:  He is here for bilateral posterior plantar wounds. 
These are pressure related decubitus ulcers due to being wheelchair bound and
nonambulatory.  He has been undergoing wound care for the last several weeks. 
He has been on treatment with Augmentin.  Seemingly, he tolerates that without
difficulty.
 
PHYSICAL EXAMINATION:  On examination, the wounds overall have improved.  There
is decreased inflammation noted.  Did undergo debridement.  Had adequate
bleeding.  On questioning, denies any systemic illness.  No fevers or chills. 
His appetite has been good.  He has been increased his oral intake as well.
 
ASSESSMENT AND PLAN:  Chronic bilateral heel decubitus ulcer with concern about
infection involving the left side.  At this point, there is no exposed hard
tissue.  I think it is reasonable to continue the current approach at least
additional 2 weeks.  We will check weekly labs.  Noted that his sed rate has
come down from 74-47.  Discussed with this patient and the patient's spouse.  We
will see him in followup in 2 weeks.
 
 
 
 
 
 
 
 
 
 
 
<ELECTRONICALLY SIGNED>
                                        By:  Malik Silva MD           
06/04/20     1129
D: 06/03/20 1533_______________________________________
T: 06/03/20 2106Joector Silva MD              /nt

## 2020-06-10 ENCOUNTER — HOSPITAL ENCOUNTER (OUTPATIENT)
Dept: HOSPITAL 96 - M.WC | Age: 75
End: 2020-06-10
Attending: PODIATRIST
Payer: MEDICARE

## 2020-06-10 DIAGNOSIS — I10: ICD-10-CM

## 2020-06-10 DIAGNOSIS — M48.061: ICD-10-CM

## 2020-06-10 DIAGNOSIS — L97.422: ICD-10-CM

## 2020-06-10 DIAGNOSIS — L97.522: ICD-10-CM

## 2020-06-10 DIAGNOSIS — Z87.891: ICD-10-CM

## 2020-06-10 DIAGNOSIS — Z85.46: ICD-10-CM

## 2020-06-10 DIAGNOSIS — E11.319: ICD-10-CM

## 2020-06-10 DIAGNOSIS — L97.411: ICD-10-CM

## 2020-06-10 DIAGNOSIS — I25.10: ICD-10-CM

## 2020-06-10 DIAGNOSIS — F32.9: ICD-10-CM

## 2020-06-10 DIAGNOSIS — Z85.118: ICD-10-CM

## 2020-06-10 DIAGNOSIS — M19.90: ICD-10-CM

## 2020-06-10 DIAGNOSIS — E11.621: Primary | ICD-10-CM

## 2020-06-10 DIAGNOSIS — E11.51: ICD-10-CM

## 2020-06-10 DIAGNOSIS — F03.90: ICD-10-CM

## 2020-06-10 DIAGNOSIS — F41.9: ICD-10-CM

## 2020-06-10 DIAGNOSIS — E11.40: ICD-10-CM

## 2020-06-10 DIAGNOSIS — L03.115: ICD-10-CM

## 2020-06-10 DIAGNOSIS — G47.30: ICD-10-CM

## 2020-06-10 DIAGNOSIS — G40.909: ICD-10-CM

## 2020-06-10 DIAGNOSIS — G25.81: ICD-10-CM

## 2020-06-10 DIAGNOSIS — E78.5: ICD-10-CM

## 2020-06-17 ENCOUNTER — HOSPITAL ENCOUNTER (OUTPATIENT)
Dept: HOSPITAL 96 - M.WC | Age: 75
End: 2020-06-17
Attending: PODIATRIST
Payer: MEDICARE

## 2020-06-17 DIAGNOSIS — F32.9: ICD-10-CM

## 2020-06-17 DIAGNOSIS — M48.061: ICD-10-CM

## 2020-06-17 DIAGNOSIS — E11.319: ICD-10-CM

## 2020-06-17 DIAGNOSIS — L97.412: ICD-10-CM

## 2020-06-17 DIAGNOSIS — F03.90: ICD-10-CM

## 2020-06-17 DIAGNOSIS — Z87.891: ICD-10-CM

## 2020-06-17 DIAGNOSIS — E11.52: ICD-10-CM

## 2020-06-17 DIAGNOSIS — E78.5: ICD-10-CM

## 2020-06-17 DIAGNOSIS — G43.909: ICD-10-CM

## 2020-06-17 DIAGNOSIS — L03.115: ICD-10-CM

## 2020-06-17 DIAGNOSIS — E11.40: ICD-10-CM

## 2020-06-17 DIAGNOSIS — Z85.46: ICD-10-CM

## 2020-06-17 DIAGNOSIS — Z85.118: ICD-10-CM

## 2020-06-17 DIAGNOSIS — M19.90: ICD-10-CM

## 2020-06-17 DIAGNOSIS — E11.42: ICD-10-CM

## 2020-06-17 DIAGNOSIS — L97.521: ICD-10-CM

## 2020-06-17 DIAGNOSIS — G47.30: ICD-10-CM

## 2020-06-17 DIAGNOSIS — G25.81: ICD-10-CM

## 2020-06-17 DIAGNOSIS — I10: ICD-10-CM

## 2020-06-17 DIAGNOSIS — I96: ICD-10-CM

## 2020-06-17 DIAGNOSIS — I25.10: ICD-10-CM

## 2020-06-17 DIAGNOSIS — F41.9: ICD-10-CM

## 2020-06-17 DIAGNOSIS — E11.621: Primary | ICD-10-CM

## 2020-06-18 NOTE — CON
[address]  [address]                    CONSULTATION                  
_______________________________________________________________________________
 
Name:       LENKALISA DEVAN                 Room:                      REG CLI 
M.R.#:  [id number]      Account #:      [id number]  
Admission:  06/17/20     Attend Phys:    Saroj Castellanos DPM 
Discharge:               Date of Birth:  [birth year]  
         Report #: [id number]
                                                                     [id number]  
_______________________________________________________________________________
THIS REPORT FOR:  //name//                      
 
cc:  Donis Michaels MD, Vinod N. MD                                               ~
THIS REPORT FOR:  //name//                      
 
CC: Saroj Michaels
 
DATE OF SERVICE:  06/17/2020
 
 
INFECTIOUS DISEASE CONSULTATION FOLLOWUP
 
ATTENDING PHYSICIAN:  Saroj Castellanos DPM
 
He is seen in the [facility] [place].
 
HISTORY OF PRESENT ILLNESS:  Chart reviewed, the patient examined.  He returns
today with bilateral decubitus ulcers involving his posterior heels. Left has
historically been more concerning than the right.  On the right, he has
superficial ulceration.  No exposed hard tissue.  It continues to show
improvement.  It has good granulation tissue.  It is not friable.  The overall
margins with less inflammation noted.  On the left side, it does have some
degree of depth.  There is a thin fibrous tissue overlying bone at one point
involving the calcaneus especially into the Achilles tendon.  Again, this is
improved as well.  There is very little necrotic material.  There is no
purulence, no odor that I can appreciate.  Overall degree of inflammation has
decreased as well.
 
ASSESSMENT AND PLAN:  Chronic bilateral heel decubitus ulcers.  I think he
remains at risk.  He has been utilizing some offloading.  Continue wound care as
prescribed by Dr. Castellanos.  We will continue the Augmentin that he has been on for
at least additional 2 weeks to see how he does clinically.
 
 
 
 
 
 
 
 
 
 
<ELECTRONICALLY SIGNED>
                                        By:  Malik Silva MD           
06/18/20     1412
D: 06/18/20 0936_______________________________________
T: 06/18/20 1023Joector Silva MD              /nt

## 2020-06-24 ENCOUNTER — HOSPITAL ENCOUNTER (OUTPATIENT)
Dept: HOSPITAL 96 - M.WC | Age: 75
End: 2020-06-24
Attending: PODIATRIST
Payer: MEDICARE

## 2020-06-24 DIAGNOSIS — M48.061: ICD-10-CM

## 2020-06-24 DIAGNOSIS — E11.52: ICD-10-CM

## 2020-06-24 DIAGNOSIS — I10: ICD-10-CM

## 2020-06-24 DIAGNOSIS — L97.411: ICD-10-CM

## 2020-06-24 DIAGNOSIS — L97.422: ICD-10-CM

## 2020-06-24 DIAGNOSIS — E11.42: ICD-10-CM

## 2020-06-24 DIAGNOSIS — F32.9: ICD-10-CM

## 2020-06-24 DIAGNOSIS — I25.10: ICD-10-CM

## 2020-06-24 DIAGNOSIS — L97.521: ICD-10-CM

## 2020-06-24 DIAGNOSIS — G25.81: ICD-10-CM

## 2020-06-24 DIAGNOSIS — L03.115: ICD-10-CM

## 2020-06-24 DIAGNOSIS — L97.511: ICD-10-CM

## 2020-06-24 DIAGNOSIS — Z85.118: ICD-10-CM

## 2020-06-24 DIAGNOSIS — E78.5: ICD-10-CM

## 2020-06-24 DIAGNOSIS — E11.319: ICD-10-CM

## 2020-06-24 DIAGNOSIS — E11.621: Primary | ICD-10-CM

## 2020-06-24 DIAGNOSIS — G47.30: ICD-10-CM

## 2020-06-24 DIAGNOSIS — Z85.46: ICD-10-CM

## 2020-06-24 DIAGNOSIS — F03.90: ICD-10-CM

## 2020-06-24 DIAGNOSIS — Z87.891: ICD-10-CM

## 2020-06-24 DIAGNOSIS — M19.90: ICD-10-CM

## 2020-06-24 DIAGNOSIS — G40.909: ICD-10-CM

## 2020-06-24 DIAGNOSIS — I96: ICD-10-CM

## 2020-07-01 ENCOUNTER — HOSPITAL ENCOUNTER (OUTPATIENT)
Dept: HOSPITAL 96 - M.WC | Age: 75
End: 2020-07-01
Attending: PODIATRIST
Payer: MEDICARE

## 2020-07-01 DIAGNOSIS — G25.81: ICD-10-CM

## 2020-07-01 DIAGNOSIS — E11.40: ICD-10-CM

## 2020-07-01 DIAGNOSIS — I10: ICD-10-CM

## 2020-07-01 DIAGNOSIS — M48.061: ICD-10-CM

## 2020-07-01 DIAGNOSIS — E11.319: ICD-10-CM

## 2020-07-01 DIAGNOSIS — G40.909: ICD-10-CM

## 2020-07-01 DIAGNOSIS — E78.5: ICD-10-CM

## 2020-07-01 DIAGNOSIS — M19.90: ICD-10-CM

## 2020-07-01 DIAGNOSIS — L97.521: ICD-10-CM

## 2020-07-01 DIAGNOSIS — F32.9: ICD-10-CM

## 2020-07-01 DIAGNOSIS — L97.412: ICD-10-CM

## 2020-07-01 DIAGNOSIS — E11.51: ICD-10-CM

## 2020-07-01 DIAGNOSIS — Z87.891: ICD-10-CM

## 2020-07-01 DIAGNOSIS — I25.10: ICD-10-CM

## 2020-07-01 DIAGNOSIS — Z85.46: ICD-10-CM

## 2020-07-01 DIAGNOSIS — F03.90: ICD-10-CM

## 2020-07-01 DIAGNOSIS — L97.422: ICD-10-CM

## 2020-07-01 DIAGNOSIS — Z85.118: ICD-10-CM

## 2020-07-01 DIAGNOSIS — E11.621: Primary | ICD-10-CM

## 2020-07-01 DIAGNOSIS — G47.30: ICD-10-CM

## 2020-07-01 DIAGNOSIS — L97.511: ICD-10-CM

## 2020-07-01 DIAGNOSIS — F41.9: ICD-10-CM

## 2020-07-01 DIAGNOSIS — L03.115: ICD-10-CM

## 2020-07-02 NOTE — CON
[address]  [address]                    CONSULTATION                  
_______________________________________________________________________________
 
Name:       LENKALISA DEVAN                 Room:                      REG CLI 
M.R.#:  [id number]      Account #:      [id number]  
Admission:  07/01/20     Attend Phys:    Saroj Castellanos DPM 
Discharge:               Date of Birth:  [birth year]  
         Report #: [id number]
                                                                     [id number]  
_______________________________________________________________________________
THIS REPORT FOR:  //name//                      
 
cc:  Donis Michaels MD, Vinod N. MD                                               ~
THIS REPORT FOR:  //name//                      
 
CC: Saroj Michaels
 
DATE OF SERVICE:  07/01/2020
 
 
INFECTIOUS DISEASE CONSULTATION FOLLOWUP
 
ATTENDING PHYSICIAN:  Saroj Castellanos DPM
 
HISTORY OF PRESENT ILLNESS:  He is here for bilateral posterior heel decubitus
ulcers, left being more severe.  He has been on systemic antibiotics with
Augmentin.  Had a previous exposed calcaneus as well as Achilles tendon. 
Generally, he has been doing fairly well.  He is still nonweightbearing,
undergoing wound care with intermittent debridements.
 
PHYSICAL EXAMINATION:  On examination today, appears to have a soft tissue over
the hard tissue.  There is mild degree of inflammation noted.  He does not have
significant pain associated with it at present.  The right-sided ulcer continues
to improve as well.
 
ASSESSMENT AND PLAN:  Deep left heel infection.  Given the totality of the
situation, I think we would extend the antibiotics for additional 2 weeks.  We
will see him back at that time.  Optimize his nutritional status.  Wound care as
prescribed by Dr. Castellanos, including offloading.
 
 
 
 
 
 
 
 
 
 
 
 
 
<ELECTRONICALLY SIGNED>
                                        By:  Malik Silva MD           
07/02/20     1108
D: 07/02/20 0857_______________________________________
T: 07/02/20 1014Joseph ALEIDA Silva MD              /nt

## 2020-07-08 ENCOUNTER — HOSPITAL ENCOUNTER (OUTPATIENT)
Dept: HOSPITAL 96 - M.WC | Age: 75
End: 2020-07-08
Attending: PODIATRIST
Payer: MEDICARE

## 2020-07-08 DIAGNOSIS — E78.5: ICD-10-CM

## 2020-07-08 DIAGNOSIS — L03.115: ICD-10-CM

## 2020-07-08 DIAGNOSIS — M48.061: ICD-10-CM

## 2020-07-08 DIAGNOSIS — E11.52: ICD-10-CM

## 2020-07-08 DIAGNOSIS — F41.9: ICD-10-CM

## 2020-07-08 DIAGNOSIS — E11.621: Primary | ICD-10-CM

## 2020-07-08 DIAGNOSIS — G40.909: ICD-10-CM

## 2020-07-08 DIAGNOSIS — I10: ICD-10-CM

## 2020-07-08 DIAGNOSIS — I96: ICD-10-CM

## 2020-07-08 DIAGNOSIS — I25.10: ICD-10-CM

## 2020-07-08 DIAGNOSIS — Z85.46: ICD-10-CM

## 2020-07-08 DIAGNOSIS — L97.411: ICD-10-CM

## 2020-07-08 DIAGNOSIS — E11.319: ICD-10-CM

## 2020-07-08 DIAGNOSIS — Z85.118: ICD-10-CM

## 2020-07-08 DIAGNOSIS — M19.90: ICD-10-CM

## 2020-07-08 DIAGNOSIS — E11.40: ICD-10-CM

## 2020-07-08 DIAGNOSIS — G47.30: ICD-10-CM

## 2020-07-08 DIAGNOSIS — F32.9: ICD-10-CM

## 2020-07-08 DIAGNOSIS — Z87.891: ICD-10-CM

## 2020-07-08 DIAGNOSIS — L97.511: ICD-10-CM

## 2020-07-08 DIAGNOSIS — F03.90: ICD-10-CM

## 2020-07-08 DIAGNOSIS — L97.521: ICD-10-CM

## 2020-07-08 DIAGNOSIS — L97.422: ICD-10-CM

## 2020-07-08 DIAGNOSIS — G25.81: ICD-10-CM

## 2020-07-15 ENCOUNTER — HOSPITAL ENCOUNTER (OUTPATIENT)
Dept: HOSPITAL 96 - M.WC | Age: 75
End: 2020-07-15
Attending: PODIATRIST
Payer: MEDICARE

## 2020-07-15 DIAGNOSIS — G40.909: ICD-10-CM

## 2020-07-15 DIAGNOSIS — Z85.118: ICD-10-CM

## 2020-07-15 DIAGNOSIS — L97.521: ICD-10-CM

## 2020-07-15 DIAGNOSIS — L03.115: ICD-10-CM

## 2020-07-15 DIAGNOSIS — I25.10: ICD-10-CM

## 2020-07-15 DIAGNOSIS — F32.9: ICD-10-CM

## 2020-07-15 DIAGNOSIS — M19.90: ICD-10-CM

## 2020-07-15 DIAGNOSIS — G25.81: ICD-10-CM

## 2020-07-15 DIAGNOSIS — L97.411: ICD-10-CM

## 2020-07-15 DIAGNOSIS — E11.621: Primary | ICD-10-CM

## 2020-07-15 DIAGNOSIS — E11.52: ICD-10-CM

## 2020-07-15 DIAGNOSIS — Z87.891: ICD-10-CM

## 2020-07-15 DIAGNOSIS — L97.426: ICD-10-CM

## 2020-07-15 DIAGNOSIS — G47.30: ICD-10-CM

## 2020-07-15 DIAGNOSIS — E11.319: ICD-10-CM

## 2020-07-15 DIAGNOSIS — F03.90: ICD-10-CM

## 2020-07-15 DIAGNOSIS — F41.9: ICD-10-CM

## 2020-07-15 DIAGNOSIS — I10: ICD-10-CM

## 2020-07-15 DIAGNOSIS — M48.061: ICD-10-CM

## 2020-07-15 DIAGNOSIS — E11.40: ICD-10-CM

## 2020-07-15 DIAGNOSIS — Z85.46: ICD-10-CM

## 2020-07-15 DIAGNOSIS — I87.2: ICD-10-CM

## 2020-07-15 DIAGNOSIS — E78.5: ICD-10-CM

## 2020-07-15 DIAGNOSIS — L97.511: ICD-10-CM

## 2020-07-15 DIAGNOSIS — I96: ICD-10-CM

## 2020-07-16 NOTE — CON
[address]  [address]                    CONSULTATION                  
_______________________________________________________________________________
 
Name:       LENKALISA DEVAN                 Room:                      REG CLI 
M.R.#:  [id number]      Account #:      [id number]  
Admission:  07/15/20     Attend Phys:    Saroj Castellanos DPM 
Discharge:               Date of Birth:  [birth year]  
         Report #: [id number]
                                                                     [id number]  
_______________________________________________________________________________
THIS REPORT FOR:  //name//                      
 
cc:  Donis Michaels MD, Vinod N. MD                                               ~
THIS REPORT FOR:  //name//                      
 
CC: Saroj Michaels
 
DATE OF SERVICE:  07/15/2020
 
 
INFECTIOUS DISEASE CONSULTATION
 
ATTENDING PHYSICIAN:  Saroj Castellanos DPM
 
HISTORY OF PRESENT ILLNESS:  The patient returns today in followup, bilateral
posterior heel decubitus type ulcers, left being worse than the right, has been
complicated by exposed tendon and infection.  He has been on extended period of
antibiotics, most recently with Augmentin last several weeks.  On presentation,
he generally feels well.  Denies any systemic signs or symptoms.  It is notable
the right heel has almost healed.  Left heel is improved.  There is still an
area of some yellowish type slough.  
 
On examination by Dr. Castellanos and myself, there is no exposed bone or tendon that
I could appreciate at this point.  He does have increased granulation tissue. 
The overall degree of inflammation at the margins is decreased as well.
 
ASSESSMENT AND PLAN:  Deep infection involving the left heel.  I think it is
reasonable to continue the Augmentin perhaps another 2 weeks.  Did write a
prescription.  Continue the offloading boots.  Critical to maintain absence of
pressure on the site.  He was encouraged to optimize his nutritional status.  He
is to call if problems.
 
 
 
 
 
 
 
 
 
 
 
<ELECTRONICALLY SIGNED>
                                        By:  Malik Silva MD           
07/16/20     1103
D: 07/16/20 0909_______________________________________
T: 07/16/20 0955Malik Silva MD              /nt

## 2020-07-22 ENCOUNTER — HOSPITAL ENCOUNTER (OUTPATIENT)
Dept: HOSPITAL 96 - M.WC | Age: 75
End: 2020-07-22
Attending: PODIATRIST
Payer: MEDICARE

## 2020-07-22 DIAGNOSIS — L97.411: ICD-10-CM

## 2020-07-22 DIAGNOSIS — L97.511: ICD-10-CM

## 2020-07-22 DIAGNOSIS — G25.81: ICD-10-CM

## 2020-07-22 DIAGNOSIS — E11.52: ICD-10-CM

## 2020-07-22 DIAGNOSIS — F41.9: ICD-10-CM

## 2020-07-22 DIAGNOSIS — Z85.46: ICD-10-CM

## 2020-07-22 DIAGNOSIS — L97.521: ICD-10-CM

## 2020-07-22 DIAGNOSIS — Z87.891: ICD-10-CM

## 2020-07-22 DIAGNOSIS — E11.40: ICD-10-CM

## 2020-07-22 DIAGNOSIS — I87.2: ICD-10-CM

## 2020-07-22 DIAGNOSIS — I96: ICD-10-CM

## 2020-07-22 DIAGNOSIS — G47.30: ICD-10-CM

## 2020-07-22 DIAGNOSIS — F03.90: ICD-10-CM

## 2020-07-22 DIAGNOSIS — G40.909: ICD-10-CM

## 2020-07-22 DIAGNOSIS — M48.061: ICD-10-CM

## 2020-07-22 DIAGNOSIS — I10: ICD-10-CM

## 2020-07-22 DIAGNOSIS — L97.421: ICD-10-CM

## 2020-07-22 DIAGNOSIS — L03.115: ICD-10-CM

## 2020-07-22 DIAGNOSIS — E78.5: ICD-10-CM

## 2020-07-22 DIAGNOSIS — F32.9: ICD-10-CM

## 2020-07-22 DIAGNOSIS — M19.90: ICD-10-CM

## 2020-07-22 DIAGNOSIS — I25.10: ICD-10-CM

## 2020-07-22 DIAGNOSIS — E11.319: ICD-10-CM

## 2020-07-22 DIAGNOSIS — E11.621: Primary | ICD-10-CM

## 2020-07-22 DIAGNOSIS — Z85.118: ICD-10-CM

## 2020-08-19 ENCOUNTER — HOSPITAL ENCOUNTER (OUTPATIENT)
Dept: HOSPITAL 96 - M.WC | Age: 75
End: 2020-08-19
Attending: PODIATRIST
Payer: MEDICARE

## 2020-08-19 DIAGNOSIS — G47.30: ICD-10-CM

## 2020-08-19 DIAGNOSIS — F41.9: ICD-10-CM

## 2020-08-19 DIAGNOSIS — E11.621: Primary | ICD-10-CM

## 2020-08-19 DIAGNOSIS — G40.909: ICD-10-CM

## 2020-08-19 DIAGNOSIS — I25.10: ICD-10-CM

## 2020-08-19 DIAGNOSIS — I10: ICD-10-CM

## 2020-08-19 DIAGNOSIS — I96: ICD-10-CM

## 2020-08-19 DIAGNOSIS — E11.52: ICD-10-CM

## 2020-08-19 DIAGNOSIS — M19.90: ICD-10-CM

## 2020-08-19 DIAGNOSIS — L97.511: ICD-10-CM

## 2020-08-19 DIAGNOSIS — F32.9: ICD-10-CM

## 2020-08-19 DIAGNOSIS — Z87.891: ICD-10-CM

## 2020-08-19 DIAGNOSIS — L97.426: ICD-10-CM

## 2020-08-19 DIAGNOSIS — E11.319: ICD-10-CM

## 2020-08-19 DIAGNOSIS — L97.421: ICD-10-CM

## 2020-08-19 DIAGNOSIS — M48.061: ICD-10-CM

## 2020-08-19 DIAGNOSIS — E78.5: ICD-10-CM

## 2020-08-19 DIAGNOSIS — Z85.118: ICD-10-CM

## 2020-08-19 DIAGNOSIS — F03.90: ICD-10-CM

## 2020-08-19 DIAGNOSIS — L97.412: ICD-10-CM

## 2020-08-19 DIAGNOSIS — Z85.46: ICD-10-CM

## 2020-08-19 DIAGNOSIS — L03.115: ICD-10-CM

## 2020-08-19 DIAGNOSIS — G25.81: ICD-10-CM

## 2020-08-19 DIAGNOSIS — E11.40: ICD-10-CM

## 2020-08-26 ENCOUNTER — HOSPITAL ENCOUNTER (OUTPATIENT)
Dept: HOSPITAL 96 - M.WC | Age: 75
End: 2020-08-26
Attending: PODIATRIST
Payer: MEDICARE

## 2020-08-26 DIAGNOSIS — L03.115: ICD-10-CM

## 2020-08-26 DIAGNOSIS — Z85.118: ICD-10-CM

## 2020-08-26 DIAGNOSIS — E11.52: ICD-10-CM

## 2020-08-26 DIAGNOSIS — I96: ICD-10-CM

## 2020-08-26 DIAGNOSIS — M48.061: ICD-10-CM

## 2020-08-26 DIAGNOSIS — G40.909: ICD-10-CM

## 2020-08-26 DIAGNOSIS — I10: ICD-10-CM

## 2020-08-26 DIAGNOSIS — E11.319: ICD-10-CM

## 2020-08-26 DIAGNOSIS — L89.899: ICD-10-CM

## 2020-08-26 DIAGNOSIS — E78.5: ICD-10-CM

## 2020-08-26 DIAGNOSIS — Z87.891: ICD-10-CM

## 2020-08-26 DIAGNOSIS — F03.90: ICD-10-CM

## 2020-08-26 DIAGNOSIS — F32.9: ICD-10-CM

## 2020-08-26 DIAGNOSIS — I25.10: ICD-10-CM

## 2020-08-26 DIAGNOSIS — L97.411: ICD-10-CM

## 2020-08-26 DIAGNOSIS — Z85.46: ICD-10-CM

## 2020-08-26 DIAGNOSIS — L97.426: ICD-10-CM

## 2020-08-26 DIAGNOSIS — G47.30: ICD-10-CM

## 2020-08-26 DIAGNOSIS — L97.522: ICD-10-CM

## 2020-08-26 DIAGNOSIS — E11.40: ICD-10-CM

## 2020-08-26 DIAGNOSIS — L97.512: ICD-10-CM

## 2020-08-26 DIAGNOSIS — G25.81: ICD-10-CM

## 2020-08-26 DIAGNOSIS — F41.9: ICD-10-CM

## 2020-08-26 DIAGNOSIS — E11.621: Primary | ICD-10-CM

## 2020-08-26 DIAGNOSIS — M19.90: ICD-10-CM

## 2020-09-02 ENCOUNTER — HOSPITAL ENCOUNTER (OUTPATIENT)
Dept: HOSPITAL 96 - M.WC | Age: 75
End: 2020-09-02
Attending: PODIATRIST
Payer: MEDICARE

## 2020-09-02 DIAGNOSIS — F03.90: ICD-10-CM

## 2020-09-02 DIAGNOSIS — G25.81: ICD-10-CM

## 2020-09-02 DIAGNOSIS — M19.90: ICD-10-CM

## 2020-09-02 DIAGNOSIS — L03.115: ICD-10-CM

## 2020-09-02 DIAGNOSIS — L97.412: ICD-10-CM

## 2020-09-02 DIAGNOSIS — G40.909: ICD-10-CM

## 2020-09-02 DIAGNOSIS — L97.426: ICD-10-CM

## 2020-09-02 DIAGNOSIS — I96: ICD-10-CM

## 2020-09-02 DIAGNOSIS — F41.9: ICD-10-CM

## 2020-09-02 DIAGNOSIS — E11.52: ICD-10-CM

## 2020-09-02 DIAGNOSIS — M48.061: ICD-10-CM

## 2020-09-02 DIAGNOSIS — E11.40: ICD-10-CM

## 2020-09-02 DIAGNOSIS — I10: ICD-10-CM

## 2020-09-02 DIAGNOSIS — E11.621: Primary | ICD-10-CM

## 2020-09-02 DIAGNOSIS — G47.30: ICD-10-CM

## 2020-09-02 DIAGNOSIS — L97.511: ICD-10-CM

## 2020-09-02 DIAGNOSIS — L89.899: ICD-10-CM

## 2020-09-02 DIAGNOSIS — Z85.46: ICD-10-CM

## 2020-09-02 DIAGNOSIS — I25.10: ICD-10-CM

## 2020-09-02 DIAGNOSIS — L97.522: ICD-10-CM

## 2020-09-02 DIAGNOSIS — F32.9: ICD-10-CM

## 2020-09-02 DIAGNOSIS — Z85.118: ICD-10-CM

## 2020-09-02 DIAGNOSIS — E11.319: ICD-10-CM

## 2020-09-02 DIAGNOSIS — E78.5: ICD-10-CM

## 2020-09-02 DIAGNOSIS — Z87.891: ICD-10-CM

## 2020-09-09 ENCOUNTER — HOSPITAL ENCOUNTER (OUTPATIENT)
Dept: HOSPITAL 96 - M.WC | Age: 75
End: 2020-09-09
Attending: PODIATRIST
Payer: MEDICARE

## 2020-09-09 DIAGNOSIS — E11.40: ICD-10-CM

## 2020-09-09 DIAGNOSIS — E11.51: ICD-10-CM

## 2020-09-09 DIAGNOSIS — E11.319: ICD-10-CM

## 2020-09-09 DIAGNOSIS — F32.9: ICD-10-CM

## 2020-09-09 DIAGNOSIS — L97.426: ICD-10-CM

## 2020-09-09 DIAGNOSIS — L97.512: ICD-10-CM

## 2020-09-09 DIAGNOSIS — E11.621: Primary | ICD-10-CM

## 2020-09-09 DIAGNOSIS — F03.90: ICD-10-CM

## 2020-09-09 DIAGNOSIS — Z87.891: ICD-10-CM

## 2020-09-09 DIAGNOSIS — I10: ICD-10-CM

## 2020-09-09 DIAGNOSIS — E78.5: ICD-10-CM

## 2020-09-09 DIAGNOSIS — G25.81: ICD-10-CM

## 2020-09-09 DIAGNOSIS — I25.10: ICD-10-CM

## 2020-09-09 DIAGNOSIS — Z85.118: ICD-10-CM

## 2020-09-09 DIAGNOSIS — M48.061: ICD-10-CM

## 2020-09-09 DIAGNOSIS — Z85.46: ICD-10-CM

## 2020-09-09 DIAGNOSIS — M19.90: ICD-10-CM

## 2020-09-09 DIAGNOSIS — Z79.4: ICD-10-CM

## 2020-09-09 DIAGNOSIS — G47.30: ICD-10-CM

## 2020-09-09 DIAGNOSIS — L89.899: ICD-10-CM

## 2020-09-09 DIAGNOSIS — L03.115: ICD-10-CM

## 2020-09-09 DIAGNOSIS — G40.909: ICD-10-CM

## 2020-09-09 DIAGNOSIS — L97.525: ICD-10-CM

## 2020-10-08 ENCOUNTER — HOSPITAL ENCOUNTER (INPATIENT)
Dept: HOSPITAL 96 - M.ERS | Age: 75
LOS: 11 days | Discharge: HOSPICE | DRG: 853 | End: 2020-10-19
Attending: INTERNAL MEDICINE | Admitting: INTERNAL MEDICINE
Payer: MEDICARE

## 2020-10-08 VITALS — HEIGHT: 74.02 IN | WEIGHT: 227.2 LBS | BODY MASS INDEX: 29.16 KG/M2

## 2020-10-08 VITALS — DIASTOLIC BLOOD PRESSURE: 48 MMHG | SYSTOLIC BLOOD PRESSURE: 106 MMHG

## 2020-10-08 DIAGNOSIS — N18.30: ICD-10-CM

## 2020-10-08 DIAGNOSIS — E11.22: ICD-10-CM

## 2020-10-08 DIAGNOSIS — L03.116: ICD-10-CM

## 2020-10-08 DIAGNOSIS — Z85.118: ICD-10-CM

## 2020-10-08 DIAGNOSIS — Z85.038: ICD-10-CM

## 2020-10-08 DIAGNOSIS — N17.0: ICD-10-CM

## 2020-10-08 DIAGNOSIS — L03.115: ICD-10-CM

## 2020-10-08 DIAGNOSIS — G25.81: ICD-10-CM

## 2020-10-08 DIAGNOSIS — Z85.46: ICD-10-CM

## 2020-10-08 DIAGNOSIS — A41.9: Primary | ICD-10-CM

## 2020-10-08 DIAGNOSIS — Z79.4: ICD-10-CM

## 2020-10-08 DIAGNOSIS — L89.519: ICD-10-CM

## 2020-10-08 DIAGNOSIS — Z23: ICD-10-CM

## 2020-10-08 DIAGNOSIS — E11.69: ICD-10-CM

## 2020-10-08 DIAGNOSIS — N30.00: ICD-10-CM

## 2020-10-08 DIAGNOSIS — E11.42: ICD-10-CM

## 2020-10-08 DIAGNOSIS — Z20.828: ICD-10-CM

## 2020-10-08 DIAGNOSIS — G92: ICD-10-CM

## 2020-10-08 DIAGNOSIS — B95.62: ICD-10-CM

## 2020-10-08 DIAGNOSIS — F32.9: ICD-10-CM

## 2020-10-08 DIAGNOSIS — Z88.8: ICD-10-CM

## 2020-10-08 DIAGNOSIS — E11.51: ICD-10-CM

## 2020-10-08 DIAGNOSIS — Z79.899: ICD-10-CM

## 2020-10-08 DIAGNOSIS — L89.151: ICD-10-CM

## 2020-10-08 DIAGNOSIS — E11.621: ICD-10-CM

## 2020-10-08 DIAGNOSIS — Z95.4: ICD-10-CM

## 2020-10-08 DIAGNOSIS — Z87.891: ICD-10-CM

## 2020-10-08 DIAGNOSIS — L89.529: ICD-10-CM

## 2020-10-08 DIAGNOSIS — G47.33: ICD-10-CM

## 2020-10-08 DIAGNOSIS — Z79.01: ICD-10-CM

## 2020-10-08 DIAGNOSIS — I12.9: ICD-10-CM

## 2020-10-08 DIAGNOSIS — M19.90: ICD-10-CM

## 2020-10-08 DIAGNOSIS — E86.0: ICD-10-CM

## 2020-10-08 DIAGNOSIS — M86.8X7: ICD-10-CM

## 2020-10-08 DIAGNOSIS — I25.10: ICD-10-CM

## 2020-10-08 LAB
ABSOLUTE BASOPHILS: 0 THOU/UL (ref 0–0.2)
ABSOLUTE EOSINOPHILS: 0 THOU/UL (ref 0–0.7)
ABSOLUTE MONOCYTES: 0.6 THOU/UL (ref 0–1.2)
ALBUMIN SERPL-MCNC: 2.9 G/DL (ref 3.4–5)
ALP SERPL-CCNC: 217 U/L (ref 46–116)
ALT SERPL-CCNC: 128 U/L (ref 30–65)
ANION GAP SERPL CALC-SCNC: 10 MMOL/L (ref 7–16)
AST SERPL-CCNC: 91 U/L (ref 15–37)
BASOPHILS NFR BLD AUTO: 0.2 %
BILIRUB SERPL-MCNC: 0.4 MG/DL
BUN SERPL-MCNC: 65 MG/DL (ref 7–18)
CALCIUM SERPL-MCNC: 8.1 MG/DL (ref 8.5–10.1)
CHLORIDE SERPL-SCNC: 105 MMOL/L (ref 98–107)
CO2 SERPL-SCNC: 22 MMOL/L (ref 21–32)
CREAT SERPL-MCNC: 2.5 MG/DL (ref 0.6–1.3)
EOSINOPHIL NFR BLD: 0 %
GLUCOSE SERPL-MCNC: 157 MG/DL (ref 70–99)
GRANULOCYTES NFR BLD MANUAL: 83.3 %
HCT VFR BLD CALC: 26.5 % (ref 42–52)
HGB BLD-MCNC: 9.1 GM/DL (ref 14–18)
INR PPP: 3.9
LIPASE: 65 U/L (ref 73–393)
LYMPHOCYTES # BLD: 0.6 THOU/UL (ref 0.8–5.3)
LYMPHOCYTES NFR BLD AUTO: 8 %
MAGNESIUM SERPL-MCNC: 1.5 MG/DL (ref 1.8–2.4)
MCH RBC QN AUTO: 31.2 PG (ref 26–34)
MCHC RBC AUTO-ENTMCNC: 34.4 G/DL (ref 28–37)
MCV RBC: 90.8 FL (ref 80–100)
MONOCYTES NFR BLD: 8.5 %
MPV: 7.6 FL. (ref 7.2–11.1)
NEUTROPHILS # BLD: 5.8 THOU/UL (ref 1.6–8.1)
NUCLEATED RBCS: 0 /100WBC
PLATELET COUNT*: 189 THOU/UL (ref 150–400)
POTASSIUM SERPL-SCNC: 4.2 MMOL/L (ref 3.5–5.1)
PROT SERPL-MCNC: 7.2 G/DL (ref 6.4–8.2)
PROTHROMBIN TIME: 38 SECONDS (ref 9.2–11.5)
RBC # BLD AUTO: 2.92 MIL/UL (ref 4.5–6)
RDW-CV: 13.9 % (ref 10.5–14.5)
SODIUM SERPL-SCNC: 137 MMOL/L (ref 136–145)
WBC # BLD AUTO: 6.9 THOU/UL (ref 4–11)

## 2020-10-09 VITALS — DIASTOLIC BLOOD PRESSURE: 58 MMHG | SYSTOLIC BLOOD PRESSURE: 136 MMHG

## 2020-10-09 VITALS — SYSTOLIC BLOOD PRESSURE: 176 MMHG | DIASTOLIC BLOOD PRESSURE: 72 MMHG

## 2020-10-09 VITALS — SYSTOLIC BLOOD PRESSURE: 105 MMHG | DIASTOLIC BLOOD PRESSURE: 69 MMHG

## 2020-10-09 VITALS — DIASTOLIC BLOOD PRESSURE: 57 MMHG | SYSTOLIC BLOOD PRESSURE: 137 MMHG

## 2020-10-09 VITALS — SYSTOLIC BLOOD PRESSURE: 180 MMHG | DIASTOLIC BLOOD PRESSURE: 62 MMHG

## 2020-10-09 VITALS — SYSTOLIC BLOOD PRESSURE: 109 MMHG | DIASTOLIC BLOOD PRESSURE: 52 MMHG

## 2020-10-09 LAB
BACTERIA-REFLEX: (no result) /HPF
BILIRUB UR-MCNC: NEGATIVE MG/DL
COARSE GRAN CASTS #/AREA URNS LPF: (no result) /LPF
COLOR UR: YELLOW
FINE GRAN CASTS #/AREA URNS LPF: (no result) /LPF
INR PPP: 3.6
KETONES UR STRIP-MCNC: NEGATIVE MG/DL
MUCUS: (no result) STRN/LPF
PROT UR QL STRIP: (no result)
PROTHROMBIN TIME: 35.5 SECONDS (ref 9.2–11.5)
RBC # UR STRIP: (no result) /UL
SP GR UR STRIP: 1.02 (ref 1–1.03)
SQUAMOUS: (no result) /LPF (ref 0–3)
URINE CLARITY: CLEAR
URINE GLUCOSE-RANDOM: NEGATIVE
URINE LEUKOCYTES-REFLEX: (no result)
URINE NITRITE-REFLEX: NEGATIVE
URINE WBC-REFLEX: (no result) /HPF (ref 0–5)
UROBILINOGEN UR STRIP-ACNC: 0.2 E.U./DL (ref 0.2–1)

## 2020-10-09 PROCEDURE — 0JBQ0ZZ EXCISION OF RIGHT FOOT SUBCUTANEOUS TISSUE AND FASCIA, OPEN APPROACH: ICD-10-PCS | Performed by: PODIATRIST

## 2020-10-09 NOTE — EKG
[place] [address]
Phone:  [phone]                     ELECTROCARDIOGRAM REPORT      
_______________________________________________________________________________
 
Name:         LISA OG                Room:          [address]    ADM IN 
.R.#:    [id number]     Account #:     [id number]  
Admission:    10/09/20    Attend Phys:   Prasanna Thomas, 
Discharge:                Date of Birth: [birth year]  
Date of Service: 10/08/20 2240  Report #:      [id number]
        [id number]
_______________________________________________________________________________
THIS REPORT FOR:  //name//                      
 
                         [facility]
                                       
Test Date:    2020-10-08               Test Time:    22:40:43
Pat Name:     LISA OG             Department:   
Patient ID:   [id number]            Room:         [facility]
Gender:       M                        Technician:   FL
:          1945               Requested By: Basilia Shipley
Order Number: [id number] MD:   Michael Talavera
                                 Measurements
Intervals                              Axis          
Rate:         67                       P:            16
CO:           179                      QRS:          -1
QRSD:         114                      T:            16
QT:           431                                    
QTc:          455                                    
                           Interpretive Statements
Sinus rhythm
Atrial premature complex
Borderline intraventricular conduction delay
Abnormal R-wave progression, early transition
Compared to ECG 2020 14:21:04
Atrial premature complex(es) now present
Electronically Signed On 10-9-2020 10:19:03 CDT by Michael Talavera
[email or web address]
 
 
 
 
 
 
 
 
 
 
 
 
 
 
 
 
 
 
  <ELECTRONICALLY SIGNED>
                                           By: Michael Talavera MD, FAC      
  10/09/20     1019
D: 10/08/20 2240   _____________________________________
T: 10/08/20 2240   Michael Talavera MD, FAC        /EPI

## 2020-10-09 NOTE — EKG
[place] [address]
Phone:  [phone]                     ELECTROCARDIOGRAM REPORT      
_______________________________________________________________________________
 
Name:         LISA OG                Room:          [address]    ADM IN 
.R.#:    [id number]     Account #:     [id number]  
Admission:    10/09/20    Attend Phys:   Prasanna Thomas, 
Discharge:                Date of Birth: [birth year]  
Date of Service: 10/08/20 2308  Report #:      [id number]
        [id number]
_______________________________________________________________________________
THIS REPORT FOR:  //name//                      
 
                         [facility]
                                       
Test Date:    2020-10-08               Test Time:    23:08:20
Pat Name:     LISA OG             Department:   
Patient ID:   [id number]            Room:         [facility]
Gender:       M                        Technician:   MR
:          1945               Requested By: Basilia Shipley
Order Number: [id number] MD:   Michael Talavera
                                 Measurements
Intervals                              Axis          
Rate:         70                       P:            17
NE:           180                      QRS:          -1
QRSD:         107                      T:            5
QT:           422                                    
QTc:          456                                    
                           Interpretive Statements
Sinus rhythm
Abnormal R-wave progression, early transition
Compared to ECG 10/08/2020 22:40:43
Atrial premature complex(es) no longer present
Electronically Signed On 10-9-2020 10:19:25 CDT by Michael Talavera
[email or web address]
 
 
 
 
 
 
 
 
 
 
 
 
 
 
 
 
 
 
 
 
  <ELECTRONICALLY SIGNED>
                                           By: Michael Talavera MD, [facility]      
  10/09/20     1019
D: 10/08/20 2308   _____________________________________
T: 10/08/20 2308   Michael Talavera MD, [facility]        /EPI

## 2020-10-10 VITALS — SYSTOLIC BLOOD PRESSURE: 131 MMHG | DIASTOLIC BLOOD PRESSURE: 59 MMHG

## 2020-10-10 VITALS — SYSTOLIC BLOOD PRESSURE: 184 MMHG | DIASTOLIC BLOOD PRESSURE: 73 MMHG

## 2020-10-10 VITALS — SYSTOLIC BLOOD PRESSURE: 146 MMHG | DIASTOLIC BLOOD PRESSURE: 71 MMHG

## 2020-10-10 VITALS — DIASTOLIC BLOOD PRESSURE: 59 MMHG | SYSTOLIC BLOOD PRESSURE: 142 MMHG

## 2020-10-10 LAB
ALBUMIN SERPL-MCNC: 2.6 G/DL (ref 3.4–5)
ALP SERPL-CCNC: 248 U/L (ref 46–116)
ALT SERPL-CCNC: 102 U/L (ref 30–65)
ANION GAP SERPL CALC-SCNC: 11 MMOL/L (ref 7–16)
AST SERPL-CCNC: 77 U/L (ref 15–37)
BILIRUB SERPL-MCNC: 0.5 MG/DL
BUN SERPL-MCNC: 47 MG/DL (ref 7–18)
CALCIUM SERPL-MCNC: 7.8 MG/DL (ref 8.5–10.1)
CHLORIDE SERPL-SCNC: 108 MMOL/L (ref 98–107)
CO2 SERPL-SCNC: 21 MMOL/L (ref 21–32)
CREAT SERPL-MCNC: 1.6 MG/DL (ref 0.6–1.3)
GLUCOSE SERPL-MCNC: 136 MG/DL (ref 70–99)
HCT VFR BLD CALC: 26.3 % (ref 42–52)
HGB BLD-MCNC: 8.6 GM/DL (ref 14–18)
INR PPP: 3.1
MAGNESIUM SERPL-MCNC: 2.2 MG/DL (ref 1.8–2.4)
MCH RBC QN AUTO: 30.1 PG (ref 26–34)
MCHC RBC AUTO-ENTMCNC: 32.7 G/DL (ref 28–37)
MCV RBC: 91.8 FL (ref 80–100)
MPV: 8.4 FL. (ref 7.2–11.1)
PLATELET COUNT*: 178 THOU/UL (ref 150–400)
POTASSIUM SERPL-SCNC: 3.8 MMOL/L (ref 3.5–5.1)
PROT SERPL-MCNC: 6.2 G/DL (ref 6.4–8.2)
PROTHROMBIN TIME: 30.6 SECONDS (ref 9.2–11.5)
RBC # BLD AUTO: 2.86 MIL/UL (ref 4.5–6)
RDW-CV: 14 % (ref 10.5–14.5)
SODIUM SERPL-SCNC: 140 MMOL/L (ref 136–145)
WBC # BLD AUTO: 6.8 THOU/UL (ref 4–11)

## 2020-10-11 VITALS — SYSTOLIC BLOOD PRESSURE: 179 MMHG | DIASTOLIC BLOOD PRESSURE: 76 MMHG

## 2020-10-11 VITALS — DIASTOLIC BLOOD PRESSURE: 72 MMHG | SYSTOLIC BLOOD PRESSURE: 162 MMHG

## 2020-10-11 VITALS — SYSTOLIC BLOOD PRESSURE: 132 MMHG | DIASTOLIC BLOOD PRESSURE: 60 MMHG

## 2020-10-11 VITALS — DIASTOLIC BLOOD PRESSURE: 66 MMHG | SYSTOLIC BLOOD PRESSURE: 153 MMHG

## 2020-10-11 VITALS — SYSTOLIC BLOOD PRESSURE: 172 MMHG | DIASTOLIC BLOOD PRESSURE: 99 MMHG

## 2020-10-11 VITALS — DIASTOLIC BLOOD PRESSURE: 55 MMHG | SYSTOLIC BLOOD PRESSURE: 161 MMHG

## 2020-10-11 LAB
ALBUMIN SERPL-MCNC: 2.4 G/DL (ref 3.4–5)
ALP SERPL-CCNC: 348 U/L (ref 46–116)
ALT SERPL-CCNC: 104 U/L (ref 30–65)
ANION GAP SERPL CALC-SCNC: 10 MMOL/L (ref 7–16)
AST SERPL-CCNC: 83 U/L (ref 15–37)
BILIRUB SERPL-MCNC: 0.7 MG/DL
BUN SERPL-MCNC: 40 MG/DL (ref 7–18)
CALCIUM SERPL-MCNC: 7.9 MG/DL (ref 8.5–10.1)
CHLORIDE SERPL-SCNC: 107 MMOL/L (ref 98–107)
CO2 SERPL-SCNC: 20 MMOL/L (ref 21–32)
CREAT SERPL-MCNC: 1.6 MG/DL (ref 0.6–1.3)
GLUCOSE SERPL-MCNC: 177 MG/DL (ref 70–99)
HCT VFR BLD CALC: 25.4 % (ref 42–52)
HGB BLD-MCNC: 8.5 GM/DL (ref 14–18)
INR PPP: 1.6
MAGNESIUM SERPL-MCNC: 1.7 MG/DL (ref 1.8–2.4)
MCH RBC QN AUTO: 30.4 PG (ref 26–34)
MCHC RBC AUTO-ENTMCNC: 33.4 G/DL (ref 28–37)
MCV RBC: 91 FL (ref 80–100)
MPV: 8.5 FL. (ref 7.2–11.1)
PLATELET COUNT*: 181 THOU/UL (ref 150–400)
POTASSIUM SERPL-SCNC: 3.9 MMOL/L (ref 3.5–5.1)
PROT SERPL-MCNC: 6.9 G/DL (ref 6.4–8.2)
PROTHROMBIN TIME: 16.7 SECONDS (ref 9.2–11.5)
RBC # BLD AUTO: 2.79 MIL/UL (ref 4.5–6)
RDW-CV: 13.8 % (ref 10.5–14.5)
SODIUM SERPL-SCNC: 137 MMOL/L (ref 136–145)
WBC # BLD AUTO: 6.4 THOU/UL (ref 4–11)

## 2020-10-12 VITALS — DIASTOLIC BLOOD PRESSURE: 75 MMHG | SYSTOLIC BLOOD PRESSURE: 172 MMHG

## 2020-10-12 VITALS — SYSTOLIC BLOOD PRESSURE: 138 MMHG | DIASTOLIC BLOOD PRESSURE: 63 MMHG

## 2020-10-12 VITALS — DIASTOLIC BLOOD PRESSURE: 70 MMHG | SYSTOLIC BLOOD PRESSURE: 169 MMHG

## 2020-10-12 VITALS — SYSTOLIC BLOOD PRESSURE: 198 MMHG | DIASTOLIC BLOOD PRESSURE: 75 MMHG

## 2020-10-12 VITALS — DIASTOLIC BLOOD PRESSURE: 93 MMHG | SYSTOLIC BLOOD PRESSURE: 192 MMHG

## 2020-10-12 VITALS — DIASTOLIC BLOOD PRESSURE: 69 MMHG | SYSTOLIC BLOOD PRESSURE: 1172 MMHG

## 2020-10-12 LAB
ALBUMIN SERPL-MCNC: 2.5 G/DL (ref 3.4–5)
ALP SERPL-CCNC: 392 U/L (ref 46–116)
ALT SERPL-CCNC: 89 U/L (ref 30–65)
ANION GAP SERPL CALC-SCNC: 12 MMOL/L (ref 7–16)
AST SERPL-CCNC: 44 U/L (ref 15–37)
BILIRUB SERPL-MCNC: 0.7 MG/DL
BUN SERPL-MCNC: 35 MG/DL (ref 7–18)
CALCIUM SERPL-MCNC: 8.6 MG/DL (ref 8.5–10.1)
CHLORIDE SERPL-SCNC: 106 MMOL/L (ref 98–107)
CO2 SERPL-SCNC: 20 MMOL/L (ref 21–32)
CREAT SERPL-MCNC: 1.5 MG/DL (ref 0.6–1.3)
GLUCOSE SERPL-MCNC: 207 MG/DL (ref 70–99)
HCT VFR BLD CALC: 27.6 % (ref 42–52)
HGB BLD-MCNC: 9.2 GM/DL (ref 14–18)
INR PPP: 1.5
MAGNESIUM SERPL-MCNC: 1.6 MG/DL (ref 1.8–2.4)
MCH RBC QN AUTO: 30.2 PG (ref 26–34)
MCHC RBC AUTO-ENTMCNC: 33.2 G/DL (ref 28–37)
MCV RBC: 90.7 FL (ref 80–100)
MPV: 8.5 FL. (ref 7.2–11.1)
PLATELET COUNT*: 225 THOU/UL (ref 150–400)
POTASSIUM SERPL-SCNC: 3.8 MMOL/L (ref 3.5–5.1)
PROT SERPL-MCNC: 7.5 G/DL (ref 6.4–8.2)
PROTHROMBIN TIME: 15.3 SECONDS (ref 9.2–11.5)
RBC # BLD AUTO: 3.04 MIL/UL (ref 4.5–6)
RDW-CV: 14.2 % (ref 10.5–14.5)
SODIUM SERPL-SCNC: 138 MMOL/L (ref 136–145)
WBC # BLD AUTO: 7.2 THOU/UL (ref 4–11)

## 2020-10-13 VITALS — SYSTOLIC BLOOD PRESSURE: 170 MMHG | DIASTOLIC BLOOD PRESSURE: 71 MMHG

## 2020-10-13 VITALS — DIASTOLIC BLOOD PRESSURE: 73 MMHG | SYSTOLIC BLOOD PRESSURE: 143 MMHG

## 2020-10-13 VITALS — SYSTOLIC BLOOD PRESSURE: 151 MMHG | DIASTOLIC BLOOD PRESSURE: 69 MMHG

## 2020-10-13 VITALS — SYSTOLIC BLOOD PRESSURE: 141 MMHG | DIASTOLIC BLOOD PRESSURE: 55 MMHG

## 2020-10-13 VITALS — DIASTOLIC BLOOD PRESSURE: 70 MMHG | SYSTOLIC BLOOD PRESSURE: 161 MMHG

## 2020-10-13 VITALS — DIASTOLIC BLOOD PRESSURE: 63 MMHG | SYSTOLIC BLOOD PRESSURE: 154 MMHG

## 2020-10-13 LAB
ALBUMIN SERPL-MCNC: 2.5 G/DL (ref 3.4–5)
ALP SERPL-CCNC: 430 U/L (ref 46–116)
ALT SERPL-CCNC: 77 U/L (ref 30–65)
ANION GAP SERPL CALC-SCNC: 10 MMOL/L (ref 7–16)
AST SERPL-CCNC: 37 U/L (ref 15–37)
BILIRUB SERPL-MCNC: 0.6 MG/DL
BUN SERPL-MCNC: 32 MG/DL (ref 7–18)
CALCIUM SERPL-MCNC: 8.7 MG/DL (ref 8.5–10.1)
CHLORIDE SERPL-SCNC: 106 MMOL/L (ref 98–107)
CO2 SERPL-SCNC: 24 MMOL/L (ref 21–32)
CREAT SERPL-MCNC: 1.4 MG/DL (ref 0.6–1.3)
GLUCOSE SERPL-MCNC: 191 MG/DL (ref 70–99)
HCT VFR BLD CALC: 27.4 % (ref 42–52)
HGB BLD-MCNC: 9.2 GM/DL (ref 14–18)
INR PPP: 1.9
MAGNESIUM SERPL-MCNC: 1.6 MG/DL (ref 1.8–2.4)
MCH RBC QN AUTO: 29.9 PG (ref 26–34)
MCHC RBC AUTO-ENTMCNC: 33.4 G/DL (ref 28–37)
MCV RBC: 89.5 FL (ref 80–100)
MPV: 8.5 FL. (ref 7.2–11.1)
PLATELET COUNT*: 270 THOU/UL (ref 150–400)
POTASSIUM SERPL-SCNC: 3.6 MMOL/L (ref 3.5–5.1)
PROT SERPL-MCNC: 7.3 G/DL (ref 6.4–8.2)
PROTHROMBIN TIME: 19 SECONDS (ref 9.2–11.5)
RBC # BLD AUTO: 3.07 MIL/UL (ref 4.5–6)
RDW-CV: 14.3 % (ref 10.5–14.5)
SODIUM SERPL-SCNC: 140 MMOL/L (ref 136–145)
WBC # BLD AUTO: 7.7 THOU/UL (ref 4–11)

## 2020-10-14 VITALS — SYSTOLIC BLOOD PRESSURE: 178 MMHG | DIASTOLIC BLOOD PRESSURE: 73 MMHG

## 2020-10-14 VITALS — SYSTOLIC BLOOD PRESSURE: 185 MMHG | DIASTOLIC BLOOD PRESSURE: 77 MMHG

## 2020-10-14 VITALS — DIASTOLIC BLOOD PRESSURE: 73 MMHG | SYSTOLIC BLOOD PRESSURE: 179 MMHG

## 2020-10-14 VITALS — SYSTOLIC BLOOD PRESSURE: 172 MMHG | DIASTOLIC BLOOD PRESSURE: 67 MMHG

## 2020-10-14 VITALS — SYSTOLIC BLOOD PRESSURE: 169 MMHG | DIASTOLIC BLOOD PRESSURE: 70 MMHG

## 2020-10-14 VITALS — SYSTOLIC BLOOD PRESSURE: 167 MMHG | DIASTOLIC BLOOD PRESSURE: 71 MMHG

## 2020-10-14 LAB
INR PPP: 2.5
PROTHROMBIN TIME: 24.4 SECONDS (ref 9.2–11.5)

## 2020-10-14 NOTE — CON
[address]  [address]                    CONSULTATION                  
_______________________________________________________________________________
 
Name:       LISA OG                 Room:           [address] [place]  
M.R.#:  [id number]      Account #:      [id number]  
Admission:  10/09/20     Attend Phys:    Prasanna Thomas MD 
Discharge:               Date of Birth:  [birth year]  
         Report #: [id number]
                                                                     [id number]  
_______________________________________________________________________________
THIS REPORT FOR:  //name//                      
 
cc:  Donis Michaels MD, Vinod N. MD                                               ~
THIS REPORT FOR:  //name//                      
 
CC: Prasanna Michaels
 
DATE OF SERVICE:  10/09/2020
 
 
ADMISSION DIAGNOSES:  Urinary tract infection, toxic encephalopathy, and
decubitus ulcerations
 
HISTORY OF PRESENT ILLNESS:  The patient was admitted through the Emergency
Department from a skilled nursing facility.  He had increased lethargy,
decreased responsiveness and p.o. intake, and drowsiness for the last 3-4 days. 
He is well known to me from outpatient treatment at [facility]
for his bilateral heel ulcers.  Since I have treated him, he has been
nonambulatory, in a motorized wheelchair.  I have been seeing him in roughly 4
weeks.  His wife says it had become increasingly difficult to transport him and
he was not feeling up to the trail end of the [facility] on a weekly basis. 
They were also trying to decrease the COVID risk.  He is currently on parenteral
vancomycin, doxycycline, and ceftriaxone.  A swab culture was taken of the right
hallux wound upon admission.  Foot radiographs show erosive changes at the
distal phalanx of the right great toe, which could represent acute
osteomyelitis.  This is directly underneath the full-thickness necrotic
ulceration to the area.  Bilateral ABIs were performed, but the vessels were
noncompressible so the results are nondiagnostic.
 
LABORATORY DATA:  WBC 6.9, RBC 2.92, hemoglobin 9.1, hematocrit 26.5, and
platelets 199.  BUN 65, creatinine 2.5, glucose 157, and albumin 2.8.  Urine
culture pending.  Blood cultures, pending, no growth x 2.
 
PHYSICAL EXAMINATION:
VITAL SIGNS:  Temperature 101.2, pulse 84, respirations ____, blood pressure
176/72.
EXTREMITIES:  The patient has multiple foot ulcerations bilaterally, namely, he
has a large ulceration to the right and left posterior calcaneus.  I had
previously been treating him for these at [facility], and they have
deteriorated since his last visit.  There is yellow/brown fibronecrotic tissue
to the base of both heel wounds.  There is exposed bone to the left posterior
calcaneus.  There is a full-thickness necrotic ulceration to the right distal
hallux with brown soft tissue, no palpable or visible bone.  He has multiple
digital wounds to the second, third, and fourth toes bilaterally.  There is a
 
 
 
[place] [address]                    CONSULTATION                  
_______________________________________________________________________________
 
Name:       LISA OG                 Room:           [address] [place]  
.R.#:  [id number]      Account #:      [id number]  
Admission:  10/09/20     Attend Phys:    Prasanna Thomas MD 
Discharge:               Date of Birth:  [birth year]  
         Report #: [id number]
                                                                     [id number]  
_______________________________________________________________________________
wound to the left lateral foot overlying the fifth metatarsal styloid process,
which has a brownish fibrotic base of slough.  Low-grade inflammation to all
wounds indicative of cellulitis.  His feet are warm, he has dopplerable dorsalis
pedis and posterior tibial pulses.
 
IMPRESSION:  Bilateral foot wounds; osteomyelitis, right great toe; type 2
diabetes mellitus; peripheral artery disease.
 
PLAN:  All the wounds were cleansed and dried.  I debrided some tissue from the
right distal hallux wound with scissors and forceps to remove some subcutaneous
tissue from the wound bed.  This was an excisional wound debridement.  All
wounds were covered with Aquacel Ag and the feet were then draped with ABDs and
wrapped with Kerlix gauze.  He is offloaded in PRAFO boots to both extremities. 
I do not recommend any surgical intervention at this point.  I would like to
perform some bedside debridement to the right distal hallux wound to remove some
slough and bone from the hallux, as he has likely osteomyelitis to that area.  I
do not recommend any formal surgical debridement in the operating room due to
his overall debility at this point.
 
 
 
 
 
 
 
 
 
 
 
 
 
 
 
 
 
 
 
 
 
 
 
 
 
 
<ELECTRONICALLY SIGNED>
                                        By:  Saroj Castellanos DPM          
10/14/20     1332
D: 10/09/20 1927_______________________________________
T: 10/10/20 0047Saroj Castellanos DPM             /nt

## 2020-10-15 VITALS — DIASTOLIC BLOOD PRESSURE: 57 MMHG | SYSTOLIC BLOOD PRESSURE: 164 MMHG

## 2020-10-15 VITALS — DIASTOLIC BLOOD PRESSURE: 63 MMHG | SYSTOLIC BLOOD PRESSURE: 165 MMHG

## 2020-10-15 VITALS — DIASTOLIC BLOOD PRESSURE: 57 MMHG | SYSTOLIC BLOOD PRESSURE: 163 MMHG

## 2020-10-15 VITALS — DIASTOLIC BLOOD PRESSURE: 72 MMHG | SYSTOLIC BLOOD PRESSURE: 164 MMHG

## 2020-10-15 VITALS — SYSTOLIC BLOOD PRESSURE: 172 MMHG | DIASTOLIC BLOOD PRESSURE: 61 MMHG

## 2020-10-15 LAB
ABSOLUTE BASOPHILS: 0 THOU/UL (ref 0–0.2)
ABSOLUTE EOSINOPHILS: 0.1 THOU/UL (ref 0–0.7)
ABSOLUTE MONOCYTES: 0.6 THOU/UL (ref 0–1.2)
ALBUMIN SERPL-MCNC: 2.3 G/DL (ref 3.4–5)
ALP SERPL-CCNC: 381 U/L (ref 46–116)
ALT SERPL-CCNC: 52 U/L (ref 30–65)
ANION GAP SERPL CALC-SCNC: 9 MMOL/L (ref 7–16)
AST SERPL-CCNC: 25 U/L (ref 15–37)
BASOPHILS NFR BLD AUTO: 0.4 %
BILIRUB SERPL-MCNC: 0.5 MG/DL
BILIRUB UR-MCNC: NEGATIVE MG/DL
BUN SERPL-MCNC: 29 MG/DL (ref 7–18)
CALCIUM SERPL-MCNC: 8.5 MG/DL (ref 8.5–10.1)
CHLORIDE SERPL-SCNC: 109 MMOL/L (ref 98–107)
CO2 SERPL-SCNC: 25 MMOL/L (ref 21–32)
COLOR UR: YELLOW
CREAT SERPL-MCNC: 1.6 MG/DL (ref 0.6–1.3)
EOSINOPHIL NFR BLD: 1 %
GLUCOSE SERPL-MCNC: 240 MG/DL (ref 70–99)
GRANULOCYTES NFR BLD MANUAL: 80.3 %
HCT VFR BLD CALC: 25.5 % (ref 42–52)
HGB BLD-MCNC: 8.4 GM/DL (ref 14–18)
INR PPP: 2.8
KETONES UR STRIP-MCNC: NEGATIVE MG/DL
LYMPHOCYTES # BLD: 0.4 THOU/UL (ref 0.8–5.3)
LYMPHOCYTES NFR BLD AUTO: 7.2 %
MCH RBC QN AUTO: 29.9 PG (ref 26–34)
MCHC RBC AUTO-ENTMCNC: 33.1 G/DL (ref 28–37)
MCV RBC: 90.5 FL (ref 80–100)
MONOCYTES NFR BLD: 11.1 %
MPV: 8.3 FL. (ref 7.2–11.1)
NEUTROPHILS # BLD: 4.4 THOU/UL (ref 1.6–8.1)
NUCLEATED RBCS: 0 /100WBC
PLATELET COUNT*: 300 THOU/UL (ref 150–400)
POTASSIUM SERPL-SCNC: 3.3 MMOL/L (ref 3.5–5.1)
PROT SERPL-MCNC: 6.7 G/DL (ref 6.4–8.2)
PROT UR QL STRIP: (no result)
PROTHROMBIN TIME: 27.7 SECONDS (ref 9.2–11.5)
RBC # BLD AUTO: 2.82 MIL/UL (ref 4.5–6)
RBC # UR STRIP: (no result) /UL
RDW-CV: 14.2 % (ref 10.5–14.5)
SODIUM SERPL-SCNC: 143 MMOL/L (ref 136–145)
SP GR UR STRIP: 1.02 (ref 1–1.03)
URINE CLARITY: CLEAR
URINE GLUCOSE-RANDOM: (no result)
URINE LEUKOCYTES-REFLEX: NEGATIVE
URINE NITRITE-REFLEX: NEGATIVE
UROBILINOGEN UR STRIP-ACNC: 0.2 E.U./DL (ref 0.2–1)
WBC # BLD AUTO: 5.4 THOU/UL (ref 4–11)

## 2020-10-16 VITALS — SYSTOLIC BLOOD PRESSURE: 174 MMHG | DIASTOLIC BLOOD PRESSURE: 60 MMHG

## 2020-10-16 VITALS — DIASTOLIC BLOOD PRESSURE: 68 MMHG | SYSTOLIC BLOOD PRESSURE: 184 MMHG

## 2020-10-16 VITALS — SYSTOLIC BLOOD PRESSURE: 198 MMHG | DIASTOLIC BLOOD PRESSURE: 69 MMHG

## 2020-10-16 VITALS — SYSTOLIC BLOOD PRESSURE: 204 MMHG | DIASTOLIC BLOOD PRESSURE: 80 MMHG

## 2020-10-16 VITALS — DIASTOLIC BLOOD PRESSURE: 75 MMHG | SYSTOLIC BLOOD PRESSURE: 194 MMHG

## 2020-10-16 VITALS — DIASTOLIC BLOOD PRESSURE: 66 MMHG | SYSTOLIC BLOOD PRESSURE: 178 MMHG

## 2020-10-17 VITALS — SYSTOLIC BLOOD PRESSURE: 198 MMHG | DIASTOLIC BLOOD PRESSURE: 76 MMHG

## 2020-10-17 VITALS — SYSTOLIC BLOOD PRESSURE: 189 MMHG | DIASTOLIC BLOOD PRESSURE: 74 MMHG

## 2020-10-17 VITALS — DIASTOLIC BLOOD PRESSURE: 69 MMHG | SYSTOLIC BLOOD PRESSURE: 185 MMHG

## 2020-10-17 VITALS — SYSTOLIC BLOOD PRESSURE: 193 MMHG | DIASTOLIC BLOOD PRESSURE: 83 MMHG

## 2020-10-17 VITALS — DIASTOLIC BLOOD PRESSURE: 61 MMHG | SYSTOLIC BLOOD PRESSURE: 159 MMHG

## 2020-10-17 VITALS — DIASTOLIC BLOOD PRESSURE: 76 MMHG | SYSTOLIC BLOOD PRESSURE: 191 MMHG

## 2020-10-17 LAB
ABSOLUTE BASOPHILS: 0 THOU/UL (ref 0–0.2)
ABSOLUTE EOSINOPHILS: 0 THOU/UL (ref 0–0.7)
ABSOLUTE MONOCYTES: 0.4 THOU/UL (ref 0–1.2)
ALBUMIN SERPL-MCNC: 2.3 G/DL (ref 3.4–5)
ALP SERPL-CCNC: 376 U/L (ref 46–116)
ALT SERPL-CCNC: 43 U/L (ref 30–65)
ANION GAP SERPL CALC-SCNC: 7 MMOL/L (ref 7–16)
AST SERPL-CCNC: 32 U/L (ref 15–37)
BASOPHILS NFR BLD AUTO: 0.7 %
BILIRUB SERPL-MCNC: 0.4 MG/DL
BUN SERPL-MCNC: 24 MG/DL (ref 7–18)
CALCIUM SERPL-MCNC: 8.1 MG/DL (ref 8.5–10.1)
CHLORIDE SERPL-SCNC: 108 MMOL/L (ref 98–107)
CO2 SERPL-SCNC: 28 MMOL/L (ref 21–32)
CREAT SERPL-MCNC: 1.7 MG/DL (ref 0.6–1.3)
EOSINOPHIL NFR BLD: 0.8 %
ESR (SEDRATE): 98 MM/HR (ref 0–20)
GLUCOSE SERPL-MCNC: 237 MG/DL (ref 70–99)
GRANULOCYTES NFR BLD MANUAL: 80.3 %
HCT VFR BLD CALC: 28 % (ref 42–52)
HGB BLD-MCNC: 9.3 GM/DL (ref 14–18)
IRON SERPL-MCNC: 11 UG/DL (ref 50–175)
LYMPHOCYTES # BLD: 0.5 THOU/UL (ref 0.8–5.3)
LYMPHOCYTES NFR BLD AUTO: 9.8 %
MAGNESIUM SERPL-MCNC: 1.4 MG/DL (ref 1.8–2.4)
MCH RBC QN AUTO: 30 PG (ref 26–34)
MCHC RBC AUTO-ENTMCNC: 33.1 G/DL (ref 28–37)
MCV RBC: 90.5 FL (ref 80–100)
MONOCYTES NFR BLD: 8.4 %
MPV: 8.3 FL. (ref 7.2–11.1)
NEUTROPHILS # BLD: 3.9 THOU/UL (ref 1.6–8.1)
NUCLEATED RBCS: 0 /100WBC
PLATELET COUNT*: 363 THOU/UL (ref 150–400)
POTASSIUM SERPL-SCNC: 3.3 MMOL/L (ref 3.5–5.1)
PROT SERPL-MCNC: 7.1 G/DL (ref 6.4–8.2)
RBC # BLD AUTO: 3.1 MIL/UL (ref 4.5–6)
RDW-CV: 14.7 % (ref 10.5–14.5)
SAO2 % BLD FROM PO2: 10 % (ref 20–39)
SODIUM SERPL-SCNC: 143 MMOL/L (ref 136–145)
WBC # BLD AUTO: 4.8 THOU/UL (ref 4–11)

## 2020-10-18 VITALS — DIASTOLIC BLOOD PRESSURE: 61 MMHG | SYSTOLIC BLOOD PRESSURE: 159 MMHG

## 2020-10-18 VITALS — SYSTOLIC BLOOD PRESSURE: 180 MMHG | DIASTOLIC BLOOD PRESSURE: 72 MMHG

## 2020-10-18 VITALS — SYSTOLIC BLOOD PRESSURE: 134 MMHG | DIASTOLIC BLOOD PRESSURE: 54 MMHG

## 2020-10-18 VITALS — SYSTOLIC BLOOD PRESSURE: 177 MMHG | DIASTOLIC BLOOD PRESSURE: 73 MMHG

## 2020-10-18 VITALS — SYSTOLIC BLOOD PRESSURE: 144 MMHG | DIASTOLIC BLOOD PRESSURE: 51 MMHG

## 2020-10-18 LAB
INR PPP: 2.9
PROTHROMBIN TIME: 28.6 SECONDS (ref 9.2–11.5)

## 2020-10-19 VITALS — SYSTOLIC BLOOD PRESSURE: 133 MMHG | DIASTOLIC BLOOD PRESSURE: 53 MMHG

## 2020-10-19 VITALS — SYSTOLIC BLOOD PRESSURE: 169 MMHG | DIASTOLIC BLOOD PRESSURE: 62 MMHG

## 2020-10-19 VITALS — SYSTOLIC BLOOD PRESSURE: 198 MMHG | DIASTOLIC BLOOD PRESSURE: 75 MMHG

## 2020-10-19 LAB — TESTOST SERPL-MCNC: 112 NG/DL (ref 264–916)

## 2020-10-20 LAB — TESTOST FREE SERPL-MCNC: 5.6 PG/ML (ref 6.6–18.1)
